# Patient Record
Sex: MALE | URBAN - METROPOLITAN AREA
[De-identification: names, ages, dates, MRNs, and addresses within clinical notes are randomized per-mention and may not be internally consistent; named-entity substitution may affect disease eponyms.]

---

## 2019-09-16 ENCOUNTER — EMERGENCY (EMERGENCY)
Facility: HOSPITAL | Age: 34
LOS: 0 days | Discharge: HOME | End: 2019-09-16
Admitting: EMERGENCY MEDICINE
Payer: OTHER MISCELLANEOUS

## 2019-09-16 VITALS
SYSTOLIC BLOOD PRESSURE: 133 MMHG | DIASTOLIC BLOOD PRESSURE: 79 MMHG | OXYGEN SATURATION: 100 % | TEMPERATURE: 99 F | RESPIRATION RATE: 18 BRPM | WEIGHT: 184.97 LBS | HEART RATE: 72 BPM
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
## 2019-09-16 DIAGNOSIS — M79.643 PAIN IN UNSPECIFIED HAND: ICD-10-CM
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
## 2019-09-16 DIAGNOSIS — S69.92XA UNSPECIFIED INJURY OF LEFT WRIST, HAND AND FINGER(S), INITIAL ENCOUNTER: ICD-10-CM
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
## 2019-09-16 PROCEDURE — 99283 EMERGENCY DEPT VISIT LOW MDM: CPT

## 2019-09-16 PROCEDURE — 73130 X-RAY EXAM OF HAND: CPT | Mod: 26,LT

## 2019-09-16 RX ORDER — IBUPROFEN 200 MG
600 TABLET ORAL ONCE
Refills: 0 | Status: COMPLETED | OUTPATIENT
Start: 2019-09-16 | End: 2019-09-16

## 2019-09-16 RX ADMIN — Medication 600 MILLIGRAM(S): at 09:24

## 2019-09-16 NOTE — ED PROVIDER NOTE - CARE PROVIDER_API CALL
Jordan Owusu)  Orthopaedic Surgery  [address] [place] [address]  Phone: [phone]  Fax: [phone]  Follow Up Time:

## 2019-09-16 NOTE — ED PROVIDER NOTE - PATIENT PORTAL LINK FT
You can access the FollowMyHealth Patient Portal offered by [facility] by registering at the following website: [email or web address]. By joining [facility] FollowMyHealth portal, you will also be able to view your health information using other applications (apps) compatible with our system.

## 2019-09-16 NOTE — ED PROVIDER NOTE - OBJECTIVE STATEMENT
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 I have pain to my thumb and index finger." no numbness/ tingling/ weakness

## 2020-02-20 ENCOUNTER — APPOINTMENT (OUTPATIENT)
Dept: UROLOGY | Facility: CLINIC | Age: 35
End: 2020-02-20

## 2020-03-10 PROBLEM — Z00.00 ENCOUNTER FOR PREVENTIVE HEALTH EXAMINATION: Status: ACTIVE | Noted: 2020-03-10

## 2022-05-05 VITALS
RESPIRATION RATE: 16 BRPM | SYSTOLIC BLOOD PRESSURE: 135 MMHG | DIASTOLIC BLOOD PRESSURE: 89 MMHG | TEMPERATURE: 97 F | HEIGHT: 68 IN | HEART RATE: 59 BPM | WEIGHT: 187.17 LBS | OXYGEN SATURATION: 100 %

## 2022-05-05 RX ORDER — APREPITANT 80 MG/1
40 CAPSULE ORAL ONCE
Refills: 0 | Status: COMPLETED | OUTPATIENT
Start: 2022-05-06 | End: 2022-05-06

## 2022-05-05 RX ORDER — GABAPENTIN 400 MG/1
300 CAPSULE ORAL ONCE
Refills: 0 | Status: COMPLETED | OUTPATIENT
Start: 2022-05-06 | End: 2022-05-06

## 2022-05-05 RX ORDER — POVIDONE-IODINE 5 %
1 AEROSOL (ML) TOPICAL ONCE
Refills: 0 | Status: COMPLETED | OUTPATIENT
Start: 2022-05-06 | End: 2022-05-06

## 2022-05-05 RX ORDER — CHLORHEXIDINE GLUCONATE 213 G/1000ML
1 SOLUTION TOPICAL ONCE
Refills: 0 | Status: COMPLETED | OUTPATIENT
Start: 2022-05-06 | End: 2022-05-06

## 2022-05-05 RX ORDER — ACETAMINOPHEN 500 MG
1000 TABLET ORAL ONCE
Refills: 0 | Status: COMPLETED | OUTPATIENT
Start: 2022-05-06 | End: 2022-05-06

## 2022-05-05 NOTE — H&P ADULT - HISTORY OF PRESENT ILLNESS
Patient is 36 y/o male with neck pain x  Patient elects to undergo ACDF C5-7, L5-S1 PSF with Dr. 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 Denies radiation of pain or numbness/tingling of his extremities. Ambulates without assistive walking devices. Failed conservative therapies for his symptoms.   Patient elects to undergo ACDF C5-7, L5-S1 PSF with Dr. Sinha

## 2022-05-05 NOTE — PRE-OP CHECKLIST - BP NONINVASIVE SYSTOLIC (MM HG)
Gastroenterology Gastroenterology Gastroenterology Nephrology Nephrology Nephrology Gastroenterology Gastroenterology Internal Medicine Nephrology Nephrology Nephrology Nephrology Pulmonology Cardiology Cardiology Cardiology Gastroenterology Gastroenterology Nephrology Pulmonology Pulmonology Pulmonology Hospitalist Nephrology Nephrology Nephrology Gastroenterology Nephrology Internal Medicine Internal Medicine Internal Medicine Internal Medicine Internal Medicine Internal Medicine Internal Medicine Internal Medicine Internal Medicine Internal Medicine Internal Medicine 135

## 2022-05-05 NOTE — H&P ADULT - NSHPLABSRESULTS_GEN_ALL_CORE
preop labs/ekg/cxr reviewed by outpatient medical clearing MD Dr. Puckett  Preop Cr 0.94  Povidone iodine nasal swab to be given day of surgery

## 2022-05-05 NOTE — H&P ADULT - PROBLEM SELECTOR PLAN 1
Admit to Orthopedic Service   For elective ACDF, PSF L5-S1   Medically cleared and optimized for surgery by Admit to Orthopedic Service   For elective ACDF, PSF L5-S1   Medically cleared and optimized for surgery by Dr. Puckett

## 2022-05-05 NOTE — PATIENT PROFILE ADULT - FUNCTIONAL ASSESSMENT - BASIC MOBILITY 2.
Duplicate encounter, patient to be scheduled for follow up visit prior to refills.   4 = No assist / stand by assistance

## 2022-05-05 NOTE — PRE-OP CHECKLIST - IV STARTED
Instructions provided and questions answered. Prescriptions verified with patient.       Christy Ortega RN  09/19/18 0777 no

## 2022-05-05 NOTE — H&P ADULT - NSHPPHYSICALEXAM_GEN_ALL_CORE
Gen:  36 y/o male, NAD  MSK: decreased ROM at the cervical spine 2/2 pain, lumbar spine 2/2 pain Gen:  36 y/o male, NAD  MSK: Skin warm and well perfused. Radial pulses palpable bilateral upper extremities. Sensation intact and equal to bilateral upper and lower extremities. Biceps/triceps 5/5 bilateral upper extremities. TA/EHL/GS/FHL 5/5 bilateral lower extremities. decreased ROM at the cervical spine 2/2 pain, lumbar spine 2/2 pain

## 2022-05-05 NOTE — H&P ADULT - NSHPSOCIALHISTORY_GEN_ALL_CORE
denies cigarette smoking  occasionally uses marijuana for pain control  per chart review drinks etoh monthly or less

## 2022-05-06 ENCOUNTER — INPATIENT (INPATIENT)
Facility: HOSPITAL | Age: 37
LOS: 3 days | Discharge: HOME | DRG: 460 | End: 2022-05-10
Attending: ORTHOPAEDIC SURGERY | Admitting: ORTHOPAEDIC SURGERY
Payer: OTHER MISCELLANEOUS

## 2022-05-06 ENCOUNTER — TRANSCRIPTION ENCOUNTER (OUTPATIENT)
Age: 37
End: 2022-05-06

## 2022-05-06 DIAGNOSIS — M43.17 SPONDYLOLISTHESIS, LUMBOSACRAL REGION: ICD-10-CM

## 2022-05-06 DIAGNOSIS — R39.11 HESITANCY OF MICTURITION: ICD-10-CM

## 2022-05-06 DIAGNOSIS — R21 RASH AND OTHER NONSPECIFIC SKIN ERUPTION: ICD-10-CM

## 2022-05-06 DIAGNOSIS — R13.10 DYSPHAGIA, UNSPECIFIED: ICD-10-CM

## 2022-05-06 DIAGNOSIS — L03.312 CELLULITIS OF BACK [ANY PART EXCEPT BUTTOCK]: ICD-10-CM

## 2022-05-06 DIAGNOSIS — L53.8 OTHER SPECIFIED ERYTHEMATOUS CONDITIONS: ICD-10-CM

## 2022-05-06 DIAGNOSIS — M54.12 RADICULOPATHY, CERVICAL REGION: ICD-10-CM

## 2022-05-06 DIAGNOSIS — M25.78 OSTEOPHYTE, VERTEBRAE: ICD-10-CM

## 2022-05-06 DIAGNOSIS — M50.122 CERVICAL DISC DISORDER AT C5-C6 LEVEL WITH RADICULOPATHY: ICD-10-CM

## 2022-05-06 DIAGNOSIS — T40.2X5A ADVERSE EFFECT OF OTHER OPIOIDS, INITIAL ENCOUNTER: ICD-10-CM

## 2022-05-06 DIAGNOSIS — M51.27 OTHER INTERVERTEBRAL DISC DISPLACEMENT, LUMBOSACRAL REGION: ICD-10-CM

## 2022-05-06 DIAGNOSIS — M48.02 SPINAL STENOSIS, CERVICAL REGION: ICD-10-CM

## 2022-05-06 DIAGNOSIS — M48.07 SPINAL STENOSIS, LUMBOSACRAL REGION: ICD-10-CM

## 2022-05-06 DIAGNOSIS — E87.1 HYPO-OSMOLALITY AND HYPONATREMIA: ICD-10-CM

## 2022-05-06 LAB
BLD GP AB SCN SERPL QL: NEGATIVE — SIGNIFICANT CHANGE UP
GAS PNL BLDA: SIGNIFICANT CHANGE UP
RH IG SCN BLD-IMP: POSITIVE — SIGNIFICANT CHANGE UP

## 2022-05-06 DEVICE — SCREW CERV STAND ALONE VAR ANG 3.5X14MM: Type: IMPLANTABLE DEVICE | Status: FUNCTIONAL

## 2022-05-06 DEVICE — GRAFT BONE INFUSE KIT MED: Type: IMPLANTABLE DEVICE | Status: FUNCTIONAL

## 2022-05-06 DEVICE — ROD 35MM: Type: IMPLANTABLE DEVICE | Status: FUNCTIONAL

## 2022-05-06 DEVICE — CAGE CERV IFD 7 DEG 15X12X8MM STAND ALONE: Type: IMPLANTABLE DEVICE | Status: FUNCTIONAL

## 2022-05-06 DEVICE — SCREW MULTI AXIAL 7.5X40MM: Type: IMPLANTABLE DEVICE | Status: FUNCTIONAL

## 2022-05-06 DEVICE — IMPLANTABLE DEVICE: Type: IMPLANTABLE DEVICE | Status: FUNCTIONAL

## 2022-05-06 DEVICE — SCREW SOLERA 7.5X45X5.5: Type: IMPLANTABLE DEVICE | Status: FUNCTIONAL

## 2022-05-06 DEVICE — SCREW SET: Type: IMPLANTABLE DEVICE | Status: FUNCTIONAL

## 2022-05-06 RX ORDER — HYDROMORPHONE HYDROCHLORIDE 2 MG/ML
0.5 INJECTION INTRAMUSCULAR; INTRAVENOUS; SUBCUTANEOUS EVERY 4 HOURS
Refills: 0 | Status: DISCONTINUED | OUTPATIENT
Start: 2022-05-06 | End: 2022-05-10

## 2022-05-06 RX ORDER — OXYCODONE HYDROCHLORIDE 5 MG/1
10 TABLET ORAL EVERY 4 HOURS
Refills: 0 | Status: DISCONTINUED | OUTPATIENT
Start: 2022-05-06 | End: 2022-05-09

## 2022-05-06 RX ORDER — ONDANSETRON 8 MG/1
4 TABLET, FILM COATED ORAL EVERY 6 HOURS
Refills: 0 | Status: DISCONTINUED | OUTPATIENT
Start: 2022-05-06 | End: 2022-05-10

## 2022-05-06 RX ORDER — HYDROMORPHONE HYDROCHLORIDE 2 MG/ML
0.5 INJECTION INTRAMUSCULAR; INTRAVENOUS; SUBCUTANEOUS
Refills: 0 | Status: DISCONTINUED | OUTPATIENT
Start: 2022-05-06 | End: 2022-05-07

## 2022-05-06 RX ORDER — METHOCARBAMOL 500 MG/1
500 TABLET, FILM COATED ORAL EVERY 8 HOURS
Refills: 0 | Status: DISCONTINUED | OUTPATIENT
Start: 2022-05-06 | End: 2022-05-10

## 2022-05-06 RX ORDER — ACETAMINOPHEN 500 MG
650 TABLET ORAL EVERY 6 HOURS
Refills: 0 | Status: DISCONTINUED | OUTPATIENT
Start: 2022-05-06 | End: 2022-05-06

## 2022-05-06 RX ORDER — SODIUM CHLORIDE 9 MG/ML
1000 INJECTION, SOLUTION INTRAVENOUS
Refills: 0 | Status: DISCONTINUED | OUTPATIENT
Start: 2022-05-06 | End: 2022-05-09

## 2022-05-06 RX ORDER — VANCOMYCIN HCL 1 G
1000 VIAL (EA) INTRAVENOUS ONCE
Refills: 0 | Status: COMPLETED | OUTPATIENT
Start: 2022-05-06 | End: 2022-05-06

## 2022-05-06 RX ORDER — POLYETHYLENE GLYCOL 3350 17 G/17G
17 POWDER, FOR SOLUTION ORAL AT BEDTIME
Refills: 0 | Status: DISCONTINUED | OUTPATIENT
Start: 2022-05-06 | End: 2022-05-10

## 2022-05-06 RX ORDER — MAGNESIUM HYDROXIDE 400 MG/1
30 TABLET, CHEWABLE ORAL DAILY
Refills: 0 | Status: DISCONTINUED | OUTPATIENT
Start: 2022-05-06 | End: 2022-05-10

## 2022-05-06 RX ORDER — ACETAMINOPHEN 500 MG
975 TABLET ORAL EVERY 8 HOURS
Refills: 0 | Status: DISCONTINUED | OUTPATIENT
Start: 2022-05-06 | End: 2022-05-10

## 2022-05-06 RX ORDER — SENNA PLUS 8.6 MG/1
2 TABLET ORAL AT BEDTIME
Refills: 0 | Status: DISCONTINUED | OUTPATIENT
Start: 2022-05-06 | End: 2022-05-10

## 2022-05-06 RX ORDER — OXYCODONE HYDROCHLORIDE 5 MG/1
5 TABLET ORAL EVERY 4 HOURS
Refills: 0 | Status: DISCONTINUED | OUTPATIENT
Start: 2022-05-06 | End: 2022-05-09

## 2022-05-06 RX ORDER — PANTOPRAZOLE SODIUM 20 MG/1
40 TABLET, DELAYED RELEASE ORAL
Refills: 0 | Status: DISCONTINUED | OUTPATIENT
Start: 2022-05-06 | End: 2022-05-10

## 2022-05-06 RX ADMIN — HYDROMORPHONE HYDROCHLORIDE 0.5 MILLIGRAM(S): 2 INJECTION INTRAMUSCULAR; INTRAVENOUS; SUBCUTANEOUS at 18:05

## 2022-05-06 RX ADMIN — HYDROMORPHONE HYDROCHLORIDE 0.5 MILLIGRAM(S): 2 INJECTION INTRAMUSCULAR; INTRAVENOUS; SUBCUTANEOUS at 19:46

## 2022-05-06 RX ADMIN — CHLORHEXIDINE GLUCONATE 1 APPLICATION(S): 213 SOLUTION TOPICAL at 08:20

## 2022-05-06 RX ADMIN — GABAPENTIN 300 MILLIGRAM(S): 400 CAPSULE ORAL at 08:31

## 2022-05-06 RX ADMIN — HYDROMORPHONE HYDROCHLORIDE 0.5 MILLIGRAM(S): 2 INJECTION INTRAMUSCULAR; INTRAVENOUS; SUBCUTANEOUS at 21:45

## 2022-05-06 RX ADMIN — Medication 975 MILLIGRAM(S): at 22:46

## 2022-05-06 RX ADMIN — Medication 975 MILLIGRAM(S): at 21:46

## 2022-05-06 RX ADMIN — Medication 50 MILLIGRAM(S): at 21:46

## 2022-05-06 RX ADMIN — METHOCARBAMOL 500 MILLIGRAM(S): 500 TABLET, FILM COATED ORAL at 21:46

## 2022-05-06 RX ADMIN — Medication 1000 MILLIGRAM(S): at 08:31

## 2022-05-06 RX ADMIN — Medication 250 MILLIGRAM(S): at 22:03

## 2022-05-06 RX ADMIN — OXYCODONE HYDROCHLORIDE 10 MILLIGRAM(S): 5 TABLET ORAL at 19:01

## 2022-05-06 RX ADMIN — HYDROMORPHONE HYDROCHLORIDE 0.5 MILLIGRAM(S): 2 INJECTION INTRAMUSCULAR; INTRAVENOUS; SUBCUTANEOUS at 22:00

## 2022-05-06 RX ADMIN — APREPITANT 40 MILLIGRAM(S): 80 CAPSULE ORAL at 08:31

## 2022-05-06 RX ADMIN — OXYCODONE HYDROCHLORIDE 10 MILLIGRAM(S): 5 TABLET ORAL at 23:49

## 2022-05-06 RX ADMIN — HYDROMORPHONE HYDROCHLORIDE 0.5 MILLIGRAM(S): 2 INJECTION INTRAMUSCULAR; INTRAVENOUS; SUBCUTANEOUS at 18:34

## 2022-05-06 RX ADMIN — Medication 1 APPLICATION(S): at 08:32

## 2022-05-06 NOTE — BRIEF OPERATIVE NOTE - NSICDXBRIEFPROCEDURE_GEN_ALL_CORE_FT
PROCEDURES:  Anterior cervical discectomy with fusion 06-May-2022 10:09:44  Aristeo Craft  Posterior fusion of lumbar spine with laminectomy 06-May-2022 10:10:07  Aristeo Craft

## 2022-05-06 NOTE — PACU DISCHARGE NOTE - COMMENTS
Met PACU criteria for transfer to [address] [place] [address], transferred via bed monitored, endorsed to MARITO Mendenhall.

## 2022-05-06 NOTE — BRIEF OPERATIVE NOTE - NSICDXBRIEFPOSTOP_GEN_ALL_CORE_FT
POST-OP DIAGNOSIS:  HNP (herniated nucleus pulposus), cervical 06-May-2022 10:10:37  Aristeo Craft  HNP (herniated nucleus pulposus), lumbar 06-May-2022 10:10:45  Aristeo Craft   POST-OP DIAGNOSIS:  HNP (herniated nucleus pulposus), cervical 06-May-2022 10:10:37  Aristeo Craft  Spondylolisthesis at L5-S1 level 06-May-2022 15:21:56  Aristeo Craft

## 2022-05-06 NOTE — BRIEF OPERATIVE NOTE - NSICDXBRIEFPREOP_GEN_ALL_CORE_FT
PRE-OP DIAGNOSIS:  HNP (herniated nucleus pulposus), lumbar 06-May-2022 10:10:15  Aristeo Craft  HNP (herniated nucleus pulposus), cervical 06-May-2022 10:10:24  Aristeo Craft   PRE-OP DIAGNOSIS:  HNP (herniated nucleus pulposus), cervical 06-May-2022 10:10:24  Aristeo Craft  Spondylolisthesis at L5-S1 level 06-May-2022 15:22:10  Aristeo Craft

## 2022-05-06 NOTE — PROGRESS NOTE ADULT - SUBJECTIVE AND OBJECTIVE BOX
Ortho Post Op Check    Procedure: ACDF C4-6, PSF L5-S1  Surgeon: Dr. COHEN Main    Subjective:  Pain controlled with medication.  Denies CP, SOB, N/V, numbness/tingling.    Objective:  Vital Signs Last 24 Hrs  T(C): 36.1 (05-06-22 @ 15:51), Max: 36.1 (05-06-22 @ 15:51)  T(F): 96.9 (05-06-22 @ 15:51), Max: 96.9 (05-06-22 @ 15:51)  HR: 72 (05-06-22 @ 19:55) (68 - 81)  BP: 132/77 (05-06-22 @ 19:06) (104/56 - 132/77)  BP(mean): 95 (05-06-22 @ 19:06) (73 - 96)  RR: 9 (05-06-22 @ 19:55) (9 - 21)  SpO2: 99% (05-06-22 @ 19:55) (99% - 100%)  AVSS    General: Pt Alert and oriented, NAD  Dressing C/D/I  B/l UE:  Sensation: SILT C5-T1   Motor:   C5 (deltoid abduction) 5/5   C6 (biceps flexion)  5/5   C7 (triceps extension) 5/5   C8 (finger flexion)  5/5   T1 (interosseous)  5/5  B/l LE:  Sensation: SILT L2-S1  Motor:   L2 (hip flexion)  5/5   L3 (knee extension)  5/5   L4 (ankle dorsiflexion)  5/5   L5 (long toe extension) 5/5   S1 (ankle plantar flexion) 5/5    A/P: 37yMale s/p ACDF C4-6, PSF L5-S1 on 05-06.  - Stable  - Pain Control  - DVT ppx: SCDs  - Post op abx: Vanco  - PT, WBS: WBAT    Ortho Pager [phone]

## 2022-05-07 LAB
ANION GAP SERPL CALC-SCNC: 12 MMOL/L — SIGNIFICANT CHANGE UP (ref 5–17)
ANISOCYTOSIS BLD QL: SLIGHT — SIGNIFICANT CHANGE UP
BASOPHILS # BLD AUTO: 0 K/UL — SIGNIFICANT CHANGE UP (ref 0–0.2)
BASOPHILS NFR BLD AUTO: 0 % — SIGNIFICANT CHANGE UP (ref 0–2)
BUN SERPL-MCNC: 11 MG/DL — SIGNIFICANT CHANGE UP (ref 7–23)
CALCIUM SERPL-MCNC: 8.8 MG/DL — SIGNIFICANT CHANGE UP (ref 8.4–10.5)
CHLORIDE SERPL-SCNC: 102 MMOL/L — SIGNIFICANT CHANGE UP (ref 96–108)
CO2 SERPL-SCNC: 25 MMOL/L — SIGNIFICANT CHANGE UP (ref 22–31)
CREAT SERPL-MCNC: 0.85 MG/DL — SIGNIFICANT CHANGE UP (ref 0.5–1.3)
EGFR: 114 ML/MIN/1.73M2 — SIGNIFICANT CHANGE UP
EOSINOPHIL # BLD AUTO: 0 K/UL — SIGNIFICANT CHANGE UP (ref 0–0.5)
EOSINOPHIL NFR BLD AUTO: 0 % — SIGNIFICANT CHANGE UP (ref 0–6)
GLUCOSE SERPL-MCNC: 121 MG/DL — HIGH (ref 70–99)
HCT VFR BLD CALC: 39.4 % — SIGNIFICANT CHANGE UP (ref 39–50)
HGB BLD-MCNC: 12.7 G/DL — LOW (ref 13–17)
HYPOCHROMIA BLD QL: SLIGHT — SIGNIFICANT CHANGE UP
LYMPHOCYTES # BLD AUTO: 1.47 K/UL — SIGNIFICANT CHANGE UP (ref 1–3.3)
LYMPHOCYTES # BLD AUTO: 10.5 % — LOW (ref 13–44)
MACROCYTES BLD QL: SLIGHT — SIGNIFICANT CHANGE UP
MANUAL SMEAR VERIFICATION: SIGNIFICANT CHANGE UP
MCHC RBC-ENTMCNC: 26.6 PG — LOW (ref 27–34)
MCHC RBC-ENTMCNC: 32.2 GM/DL — SIGNIFICANT CHANGE UP (ref 32–36)
MCV RBC AUTO: 82.4 FL — SIGNIFICANT CHANGE UP (ref 80–100)
MICROCYTES BLD QL: SLIGHT — SIGNIFICANT CHANGE UP
MONOCYTES # BLD AUTO: 1.46 K/UL — HIGH (ref 0–0.9)
MONOCYTES NFR BLD AUTO: 10.4 % — SIGNIFICANT CHANGE UP (ref 2–14)
NEUTROPHILS # BLD AUTO: 11.1 K/UL — HIGH (ref 1.8–7.4)
NEUTROPHILS NFR BLD AUTO: 79.1 % — HIGH (ref 43–77)
OVALOCYTES BLD QL SMEAR: SLIGHT — SIGNIFICANT CHANGE UP
PLAT MORPH BLD: NORMAL — SIGNIFICANT CHANGE UP
PLATELET # BLD AUTO: 265 K/UL — SIGNIFICANT CHANGE UP (ref 150–400)
POIKILOCYTOSIS BLD QL AUTO: SLIGHT — SIGNIFICANT CHANGE UP
POLYCHROMASIA BLD QL SMEAR: SLIGHT — SIGNIFICANT CHANGE UP
POTASSIUM SERPL-MCNC: 3.7 MMOL/L — SIGNIFICANT CHANGE UP (ref 3.5–5.3)
POTASSIUM SERPL-SCNC: 3.7 MMOL/L — SIGNIFICANT CHANGE UP (ref 3.5–5.3)
RBC # BLD: 4.78 M/UL — SIGNIFICANT CHANGE UP (ref 4.2–5.8)
RBC # FLD: 13.1 % — SIGNIFICANT CHANGE UP (ref 10.3–14.5)
RBC BLD AUTO: ABNORMAL
SODIUM SERPL-SCNC: 139 MMOL/L — SIGNIFICANT CHANGE UP (ref 135–145)
SPHEROCYTES BLD QL SMEAR: SLIGHT — SIGNIFICANT CHANGE UP
WBC # BLD: 14.03 K/UL — HIGH (ref 3.8–10.5)
WBC # FLD AUTO: 14.03 K/UL — HIGH (ref 3.8–10.5)

## 2022-05-07 RX ADMIN — OXYCODONE HYDROCHLORIDE 10 MILLIGRAM(S): 5 TABLET ORAL at 05:47

## 2022-05-07 RX ADMIN — Medication 975 MILLIGRAM(S): at 13:45

## 2022-05-07 RX ADMIN — Medication 975 MILLIGRAM(S): at 21:24

## 2022-05-07 RX ADMIN — OXYCODONE HYDROCHLORIDE 10 MILLIGRAM(S): 5 TABLET ORAL at 16:10

## 2022-05-07 RX ADMIN — Medication 50 MILLIGRAM(S): at 13:16

## 2022-05-07 RX ADMIN — Medication 50 MILLIGRAM(S): at 21:24

## 2022-05-07 RX ADMIN — HYDROMORPHONE HYDROCHLORIDE 0.5 MILLIGRAM(S): 2 INJECTION INTRAMUSCULAR; INTRAVENOUS; SUBCUTANEOUS at 21:40

## 2022-05-07 RX ADMIN — OXYCODONE HYDROCHLORIDE 10 MILLIGRAM(S): 5 TABLET ORAL at 11:38

## 2022-05-07 RX ADMIN — HYDROMORPHONE HYDROCHLORIDE 0.5 MILLIGRAM(S): 2 INJECTION INTRAMUSCULAR; INTRAVENOUS; SUBCUTANEOUS at 18:00

## 2022-05-07 RX ADMIN — OXYCODONE HYDROCHLORIDE 10 MILLIGRAM(S): 5 TABLET ORAL at 20:35

## 2022-05-07 RX ADMIN — Medication 975 MILLIGRAM(S): at 13:16

## 2022-05-07 RX ADMIN — POLYETHYLENE GLYCOL 3350 17 GRAM(S): 17 POWDER, FOR SOLUTION ORAL at 21:25

## 2022-05-07 RX ADMIN — HYDROMORPHONE HYDROCHLORIDE 0.5 MILLIGRAM(S): 2 INJECTION INTRAMUSCULAR; INTRAVENOUS; SUBCUTANEOUS at 02:50

## 2022-05-07 RX ADMIN — METHOCARBAMOL 500 MILLIGRAM(S): 500 TABLET, FILM COATED ORAL at 21:24

## 2022-05-07 RX ADMIN — METHOCARBAMOL 500 MILLIGRAM(S): 500 TABLET, FILM COATED ORAL at 13:16

## 2022-05-07 RX ADMIN — POLYETHYLENE GLYCOL 3350 17 GRAM(S): 17 POWDER, FOR SOLUTION ORAL at 05:42

## 2022-05-07 RX ADMIN — METHOCARBAMOL 500 MILLIGRAM(S): 500 TABLET, FILM COATED ORAL at 05:41

## 2022-05-07 RX ADMIN — MAGNESIUM HYDROXIDE 30 MILLILITER(S): 400 TABLET, CHEWABLE ORAL at 12:13

## 2022-05-07 RX ADMIN — OXYCODONE HYDROCHLORIDE 10 MILLIGRAM(S): 5 TABLET ORAL at 00:49

## 2022-05-07 RX ADMIN — Medication 975 MILLIGRAM(S): at 22:24

## 2022-05-07 RX ADMIN — HYDROMORPHONE HYDROCHLORIDE 0.5 MILLIGRAM(S): 2 INJECTION INTRAMUSCULAR; INTRAVENOUS; SUBCUTANEOUS at 03:15

## 2022-05-07 RX ADMIN — HYDROMORPHONE HYDROCHLORIDE 0.5 MILLIGRAM(S): 2 INJECTION INTRAMUSCULAR; INTRAVENOUS; SUBCUTANEOUS at 08:04

## 2022-05-07 RX ADMIN — Medication 975 MILLIGRAM(S): at 06:41

## 2022-05-07 RX ADMIN — HYDROMORPHONE HYDROCHLORIDE 0.5 MILLIGRAM(S): 2 INJECTION INTRAMUSCULAR; INTRAVENOUS; SUBCUTANEOUS at 13:30

## 2022-05-07 RX ADMIN — HYDROMORPHONE HYDROCHLORIDE 0.5 MILLIGRAM(S): 2 INJECTION INTRAMUSCULAR; INTRAVENOUS; SUBCUTANEOUS at 22:10

## 2022-05-07 RX ADMIN — Medication 975 MILLIGRAM(S): at 05:41

## 2022-05-07 RX ADMIN — SENNA PLUS 2 TABLET(S): 8.6 TABLET ORAL at 21:24

## 2022-05-07 RX ADMIN — Medication 50 MILLIGRAM(S): at 05:41

## 2022-05-07 RX ADMIN — OXYCODONE HYDROCHLORIDE 10 MILLIGRAM(S): 5 TABLET ORAL at 15:40

## 2022-05-07 RX ADMIN — OXYCODONE HYDROCHLORIDE 10 MILLIGRAM(S): 5 TABLET ORAL at 04:47

## 2022-05-07 RX ADMIN — HYDROMORPHONE HYDROCHLORIDE 0.5 MILLIGRAM(S): 2 INJECTION INTRAMUSCULAR; INTRAVENOUS; SUBCUTANEOUS at 13:14

## 2022-05-07 RX ADMIN — OXYCODONE HYDROCHLORIDE 10 MILLIGRAM(S): 5 TABLET ORAL at 12:10

## 2022-05-07 RX ADMIN — PANTOPRAZOLE SODIUM 40 MILLIGRAM(S): 20 TABLET, DELAYED RELEASE ORAL at 05:41

## 2022-05-07 RX ADMIN — HYDROMORPHONE HYDROCHLORIDE 0.5 MILLIGRAM(S): 2 INJECTION INTRAMUSCULAR; INTRAVENOUS; SUBCUTANEOUS at 17:37

## 2022-05-07 RX ADMIN — OXYCODONE HYDROCHLORIDE 10 MILLIGRAM(S): 5 TABLET ORAL at 19:35

## 2022-05-07 NOTE — PHYSICAL THERAPY INITIAL EVALUATION ADULT - ADDITIONAL COMMENTS
Pt lives in a multi-level house with wife & children, 5 MIRTA + 5 + 5 steps to get into bedroom. Owns recliner bed. No DME

## 2022-05-07 NOTE — PROGRESS NOTE ADULT - SUBJECTIVE AND OBJECTIVE BOX
Ortho Floor Note    Subjective:  Pain controlled with medication. Penrose drain removed this AM.  Denies CP, SOB, N/V, numbness/tingling.    Objective:  Vital Signs Last 24 Hrs  T(C): 36.1 (05-06-22 @ 15:51), Max: 36.1 (05-06-22 @ 15:51)  T(F): 96.9 (05-06-22 @ 15:51), Max: 96.9 (05-06-22 @ 15:51)  HR: 72 (05-06-22 @ 19:55) (68 - 81)  BP: 132/77 (05-06-22 @ 19:06) (104/56 - 132/77)  BP(mean): 95 (05-06-22 @ 19:06) (73 - 96)  RR: 9 (05-06-22 @ 19:55) (9 - 21)  SpO2: 99% (05-06-22 @ 19:55) (99% - 100%)  AVSS    General: Pt Alert and oriented, NAD  Dressing C/D/I  B/l UE:  Sensation: SILT C5-T1   Motor:   C5 (deltoid abduction) 5/5   C6 (biceps flexion)  5/5   C7 (triceps extension) 5/5   C8 (finger flexion)  5/5   T1 (interosseous)  5/5  B/l LE:  Sensation: SILT L2-S1  Motor:   L2 (hip flexion)  5/5   L3 (knee extension)  5/5   L4 (ankle dorsiflexion)  5/5   L5 (long toe extension) 5/5   S1 (ankle plantar flexion) 5/5    A/P: 37yMale s/p ACDF C4-6, PSF L5-S1 on 05-06.  - Stable  - Pain Control  - DVT ppx: SCDs  - PT, WBS: WBAT  - Dispo: Home    Ortho Pager [phone]

## 2022-05-07 NOTE — OCCUPATIONAL THERAPY INITIAL EVALUATION ADULT - PERTINENT HX OF CURRENT PROBLEM, REHAB EVAL
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 Denies radiation of pain or numbness/tingling of his extremities. Ambulates without assistive walking devices. Failed conservative therapies for his symptoms.

## 2022-05-07 NOTE — PHYSICAL THERAPY INITIAL EVALUATION ADULT - GENERAL OBSERVATIONS, REHAB EVAL
Patient received semi-winchester in bed  in NAD on RA, +SCDs, +Heplock, +tele. Dressings C/D/I Cleared by RNVonnie. Agreeable to PT.

## 2022-05-08 LAB
ANION GAP SERPL CALC-SCNC: 9 MMOL/L — SIGNIFICANT CHANGE UP (ref 5–17)
BASOPHILS # BLD AUTO: 0.04 K/UL — SIGNIFICANT CHANGE UP (ref 0–0.2)
BASOPHILS NFR BLD AUTO: 0.3 % — SIGNIFICANT CHANGE UP (ref 0–2)
BUN SERPL-MCNC: 10 MG/DL — SIGNIFICANT CHANGE UP (ref 7–23)
CALCIUM SERPL-MCNC: 8.4 MG/DL — SIGNIFICANT CHANGE UP (ref 8.4–10.5)
CHLORIDE SERPL-SCNC: 98 MMOL/L — SIGNIFICANT CHANGE UP (ref 96–108)
CO2 SERPL-SCNC: 26 MMOL/L — SIGNIFICANT CHANGE UP (ref 22–31)
CREAT SERPL-MCNC: 0.95 MG/DL — SIGNIFICANT CHANGE UP (ref 0.5–1.3)
EGFR: 105 ML/MIN/1.73M2 — SIGNIFICANT CHANGE UP
EOSINOPHIL # BLD AUTO: 0.05 K/UL — SIGNIFICANT CHANGE UP (ref 0–0.5)
EOSINOPHIL NFR BLD AUTO: 0.4 % — SIGNIFICANT CHANGE UP (ref 0–6)
GLUCOSE SERPL-MCNC: 106 MG/DL — HIGH (ref 70–99)
HCT VFR BLD CALC: 39.3 % — SIGNIFICANT CHANGE UP (ref 39–50)
HGB BLD-MCNC: 12.5 G/DL — LOW (ref 13–17)
IMM GRANULOCYTES NFR BLD AUTO: 0.7 % — SIGNIFICANT CHANGE UP (ref 0–1.5)
LYMPHOCYTES # BLD AUTO: 1.76 K/UL — SIGNIFICANT CHANGE UP (ref 1–3.3)
LYMPHOCYTES # BLD AUTO: 15.1 % — SIGNIFICANT CHANGE UP (ref 13–44)
MCHC RBC-ENTMCNC: 26.5 PG — LOW (ref 27–34)
MCHC RBC-ENTMCNC: 31.8 GM/DL — LOW (ref 32–36)
MCV RBC AUTO: 83.3 FL — SIGNIFICANT CHANGE UP (ref 80–100)
MONOCYTES # BLD AUTO: 1.63 K/UL — HIGH (ref 0–0.9)
MONOCYTES NFR BLD AUTO: 13.9 % — SIGNIFICANT CHANGE UP (ref 2–14)
NEUTROPHILS # BLD AUTO: 8.13 K/UL — HIGH (ref 1.8–7.4)
NEUTROPHILS NFR BLD AUTO: 69.6 % — SIGNIFICANT CHANGE UP (ref 43–77)
NRBC # BLD: 0 /100 WBCS — SIGNIFICANT CHANGE UP (ref 0–0)
PLATELET # BLD AUTO: 227 K/UL — SIGNIFICANT CHANGE UP (ref 150–400)
POTASSIUM SERPL-MCNC: 3.7 MMOL/L — SIGNIFICANT CHANGE UP (ref 3.5–5.3)
POTASSIUM SERPL-SCNC: 3.7 MMOL/L — SIGNIFICANT CHANGE UP (ref 3.5–5.3)
RBC # BLD: 4.72 M/UL — SIGNIFICANT CHANGE UP (ref 4.2–5.8)
RBC # FLD: 13.2 % — SIGNIFICANT CHANGE UP (ref 10.3–14.5)
SODIUM SERPL-SCNC: 133 MMOL/L — LOW (ref 135–145)
WBC # BLD: 11.69 K/UL — HIGH (ref 3.8–10.5)
WBC # FLD AUTO: 11.69 K/UL — HIGH (ref 3.8–10.5)

## 2022-05-08 RX ORDER — DIPHENHYDRAMINE HCL 50 MG
50 CAPSULE ORAL EVERY 6 HOURS
Refills: 0 | Status: DISCONTINUED | OUTPATIENT
Start: 2022-05-08 | End: 2022-05-10

## 2022-05-08 RX ORDER — HYDROCORTISONE 1 %
1 OINTMENT (GRAM) TOPICAL DAILY
Refills: 0 | Status: DISCONTINUED | OUTPATIENT
Start: 2022-05-08 | End: 2022-05-09

## 2022-05-08 RX ADMIN — PANTOPRAZOLE SODIUM 40 MILLIGRAM(S): 20 TABLET, DELAYED RELEASE ORAL at 05:43

## 2022-05-08 RX ADMIN — Medication 50 MILLIGRAM(S): at 23:15

## 2022-05-08 RX ADMIN — METHOCARBAMOL 500 MILLIGRAM(S): 500 TABLET, FILM COATED ORAL at 14:11

## 2022-05-08 RX ADMIN — Medication 975 MILLIGRAM(S): at 22:34

## 2022-05-08 RX ADMIN — HYDROMORPHONE HYDROCHLORIDE 0.5 MILLIGRAM(S): 2 INJECTION INTRAMUSCULAR; INTRAVENOUS; SUBCUTANEOUS at 12:05

## 2022-05-08 RX ADMIN — OXYCODONE HYDROCHLORIDE 10 MILLIGRAM(S): 5 TABLET ORAL at 10:31

## 2022-05-08 RX ADMIN — OXYCODONE HYDROCHLORIDE 10 MILLIGRAM(S): 5 TABLET ORAL at 14:17

## 2022-05-08 RX ADMIN — Medication 50 MILLIGRAM(S): at 17:47

## 2022-05-08 RX ADMIN — HYDROMORPHONE HYDROCHLORIDE 0.5 MILLIGRAM(S): 2 INJECTION INTRAMUSCULAR; INTRAVENOUS; SUBCUTANEOUS at 11:29

## 2022-05-08 RX ADMIN — OXYCODONE HYDROCHLORIDE 10 MILLIGRAM(S): 5 TABLET ORAL at 23:15

## 2022-05-08 RX ADMIN — OXYCODONE HYDROCHLORIDE 10 MILLIGRAM(S): 5 TABLET ORAL at 18:47

## 2022-05-08 RX ADMIN — METHOCARBAMOL 500 MILLIGRAM(S): 500 TABLET, FILM COATED ORAL at 05:44

## 2022-05-08 RX ADMIN — HYDROMORPHONE HYDROCHLORIDE 0.5 MILLIGRAM(S): 2 INJECTION INTRAMUSCULAR; INTRAVENOUS; SUBCUTANEOUS at 16:25

## 2022-05-08 RX ADMIN — POLYETHYLENE GLYCOL 3350 17 GRAM(S): 17 POWDER, FOR SOLUTION ORAL at 21:35

## 2022-05-08 RX ADMIN — Medication 975 MILLIGRAM(S): at 06:37

## 2022-05-08 RX ADMIN — HYDROMORPHONE HYDROCHLORIDE 0.5 MILLIGRAM(S): 2 INJECTION INTRAMUSCULAR; INTRAVENOUS; SUBCUTANEOUS at 03:24

## 2022-05-08 RX ADMIN — Medication 975 MILLIGRAM(S): at 15:02

## 2022-05-08 RX ADMIN — OXYCODONE HYDROCHLORIDE 10 MILLIGRAM(S): 5 TABLET ORAL at 01:32

## 2022-05-08 RX ADMIN — OXYCODONE HYDROCHLORIDE 10 MILLIGRAM(S): 5 TABLET ORAL at 09:50

## 2022-05-08 RX ADMIN — HYDROMORPHONE HYDROCHLORIDE 0.5 MILLIGRAM(S): 2 INJECTION INTRAMUSCULAR; INTRAVENOUS; SUBCUTANEOUS at 03:53

## 2022-05-08 RX ADMIN — Medication 975 MILLIGRAM(S): at 05:44

## 2022-05-08 RX ADMIN — OXYCODONE HYDROCHLORIDE 10 MILLIGRAM(S): 5 TABLET ORAL at 19:32

## 2022-05-08 RX ADMIN — HYDROMORPHONE HYDROCHLORIDE 0.5 MILLIGRAM(S): 2 INJECTION INTRAMUSCULAR; INTRAVENOUS; SUBCUTANEOUS at 21:14

## 2022-05-08 RX ADMIN — METHOCARBAMOL 500 MILLIGRAM(S): 500 TABLET, FILM COATED ORAL at 21:35

## 2022-05-08 RX ADMIN — Medication 975 MILLIGRAM(S): at 14:11

## 2022-05-08 RX ADMIN — HYDROMORPHONE HYDROCHLORIDE 0.5 MILLIGRAM(S): 2 INJECTION INTRAMUSCULAR; INTRAVENOUS; SUBCUTANEOUS at 20:44

## 2022-05-08 RX ADMIN — Medication 975 MILLIGRAM(S): at 21:34

## 2022-05-08 RX ADMIN — HYDROMORPHONE HYDROCHLORIDE 0.5 MILLIGRAM(S): 2 INJECTION INTRAMUSCULAR; INTRAVENOUS; SUBCUTANEOUS at 17:16

## 2022-05-08 RX ADMIN — OXYCODONE HYDROCHLORIDE 10 MILLIGRAM(S): 5 TABLET ORAL at 15:16

## 2022-05-08 RX ADMIN — OXYCODONE HYDROCHLORIDE 10 MILLIGRAM(S): 5 TABLET ORAL at 00:33

## 2022-05-08 RX ADMIN — Medication 50 MILLIGRAM(S): at 05:43

## 2022-05-08 RX ADMIN — Medication 50 MILLIGRAM(S): at 21:35

## 2022-05-08 RX ADMIN — SENNA PLUS 2 TABLET(S): 8.6 TABLET ORAL at 21:35

## 2022-05-08 RX ADMIN — Medication 50 MILLIGRAM(S): at 15:09

## 2022-05-08 NOTE — PROGRESS NOTE ADULT - SUBJECTIVE AND OBJECTIVE BOX
Ortho Floor Note    Subjective:  Complaining of some pain, controlled with medication.  Denies CP, SOB, N/V, numbness/tingling.    Objective:  Vital Signs Last 24 Hrs  T(C): 37.1 (08 May 2022 04:45), Max: 37.1 (08 May 2022 04:45)  T(F): 98.7 (08 May 2022 04:45), Max: 98.7 (08 May 2022 04:45)  HR: 79 (08 May 2022 04:45) (71 - 111)  BP: 126/80 (08 May 2022 04:45) (116/79 - 137/81)  BP(mean): --  RR: 17 (08 May 2022 04:45) (16 - 18)  SpO2: 99% (08 May 2022 04:45) (97% - 100%)    General: Pt Alert and oriented, NAD  Dressing C/D/I  B/l UE:  Sensation: SILT C5-T1   Motor:   C5 (deltoid abduction) 5/5   C6 (biceps flexion)  5/5   C7 (triceps extension) 5/5   C8 (finger flexion)  5/5   T1 (interosseous)  5/5  B/l LE:  Sensation: SILT L2-S1  Motor:   L2 (hip flexion)  5/5   L3 (knee extension)  5/5   L4 (ankle dorsiflexion)  5/5   L5 (long toe extension) 5/5   S1 (ankle plantar flexion) 5/5    A/P: 37yMale s/p ACDF C4-6, PSF L5-S1 on 05-06.  - Stable  - Pain Control  - DVT ppx: SCDs  - PT, WBS: WBAT  - Dispo: Home    Ortho Pager [phone]

## 2022-05-09 ENCOUNTER — TRANSCRIPTION ENCOUNTER (OUTPATIENT)
Age: 37
End: 2022-05-09

## 2022-05-09 LAB
ANION GAP SERPL CALC-SCNC: 12 MMOL/L — SIGNIFICANT CHANGE UP (ref 5–17)
BUN SERPL-MCNC: 9 MG/DL — SIGNIFICANT CHANGE UP (ref 7–23)
CALCIUM SERPL-MCNC: 8.7 MG/DL — SIGNIFICANT CHANGE UP (ref 8.4–10.5)
CHLORIDE SERPL-SCNC: 96 MMOL/L — SIGNIFICANT CHANGE UP (ref 96–108)
CO2 SERPL-SCNC: 23 MMOL/L — SIGNIFICANT CHANGE UP (ref 22–31)
CREAT SERPL-MCNC: 0.88 MG/DL — SIGNIFICANT CHANGE UP (ref 0.5–1.3)
EGFR: 113 ML/MIN/1.73M2 — SIGNIFICANT CHANGE UP
GLUCOSE SERPL-MCNC: 115 MG/DL — HIGH (ref 70–99)
HCT VFR BLD CALC: 41.9 % — SIGNIFICANT CHANGE UP (ref 39–50)
HGB BLD-MCNC: 13.8 G/DL — SIGNIFICANT CHANGE UP (ref 13–17)
MCHC RBC-ENTMCNC: 27.3 PG — SIGNIFICANT CHANGE UP (ref 27–34)
MCHC RBC-ENTMCNC: 32.9 GM/DL — SIGNIFICANT CHANGE UP (ref 32–36)
MCV RBC AUTO: 82.8 FL — SIGNIFICANT CHANGE UP (ref 80–100)
NRBC # BLD: 0 /100 WBCS — SIGNIFICANT CHANGE UP (ref 0–0)
OSMOLALITY UR: 173 MOSM/KG — LOW (ref 300–900)
PLATELET # BLD AUTO: 257 K/UL — SIGNIFICANT CHANGE UP (ref 150–400)
POTASSIUM SERPL-MCNC: 3.9 MMOL/L — SIGNIFICANT CHANGE UP (ref 3.5–5.3)
POTASSIUM SERPL-SCNC: 3.9 MMOL/L — SIGNIFICANT CHANGE UP (ref 3.5–5.3)
RBC # BLD: 5.06 M/UL — SIGNIFICANT CHANGE UP (ref 4.2–5.8)
RBC # FLD: 12.9 % — SIGNIFICANT CHANGE UP (ref 10.3–14.5)
SODIUM SERPL-SCNC: 131 MMOL/L — LOW (ref 135–145)
SODIUM UR-SCNC: <20 MMOL/L — SIGNIFICANT CHANGE UP
WBC # BLD: 13.46 K/UL — HIGH (ref 3.8–10.5)
WBC # FLD AUTO: 13.46 K/UL — HIGH (ref 3.8–10.5)

## 2022-05-09 PROCEDURE — 99255 IP/OBS CONSLTJ NEW/EST HI 80: CPT

## 2022-05-09 RX ORDER — HYDROMORPHONE HYDROCHLORIDE 2 MG/ML
4 INJECTION INTRAMUSCULAR; INTRAVENOUS; SUBCUTANEOUS EVERY 4 HOURS
Refills: 0 | Status: DISCONTINUED | OUTPATIENT
Start: 2022-05-09 | End: 2022-05-10

## 2022-05-09 RX ORDER — HYDROCORTISONE 1 %
1 OINTMENT (GRAM) TOPICAL
Refills: 0 | Status: DISCONTINUED | OUTPATIENT
Start: 2022-05-09 | End: 2022-05-10

## 2022-05-09 RX ORDER — BENZOCAINE AND MENTHOL 5; 1 G/100ML; G/100ML
1 LIQUID ORAL
Refills: 0 | Status: DISCONTINUED | OUTPATIENT
Start: 2022-05-09 | End: 2022-05-10

## 2022-05-09 RX ORDER — HYDROMORPHONE HYDROCHLORIDE 2 MG/ML
2 INJECTION INTRAMUSCULAR; INTRAVENOUS; SUBCUTANEOUS EVERY 4 HOURS
Refills: 0 | Status: DISCONTINUED | OUTPATIENT
Start: 2022-05-09 | End: 2022-05-10

## 2022-05-09 RX ADMIN — Medication 50 MILLIGRAM(S): at 05:33

## 2022-05-09 RX ADMIN — HYDROMORPHONE HYDROCHLORIDE 4 MILLIGRAM(S): 2 INJECTION INTRAMUSCULAR; INTRAVENOUS; SUBCUTANEOUS at 22:15

## 2022-05-09 RX ADMIN — HYDROMORPHONE HYDROCHLORIDE 0.5 MILLIGRAM(S): 2 INJECTION INTRAMUSCULAR; INTRAVENOUS; SUBCUTANEOUS at 15:10

## 2022-05-09 RX ADMIN — SENNA PLUS 2 TABLET(S): 8.6 TABLET ORAL at 21:17

## 2022-05-09 RX ADMIN — Medication 975 MILLIGRAM(S): at 06:33

## 2022-05-09 RX ADMIN — HYDROMORPHONE HYDROCHLORIDE 0.5 MILLIGRAM(S): 2 INJECTION INTRAMUSCULAR; INTRAVENOUS; SUBCUTANEOUS at 19:19

## 2022-05-09 RX ADMIN — HYDROMORPHONE HYDROCHLORIDE 0.5 MILLIGRAM(S): 2 INJECTION INTRAMUSCULAR; INTRAVENOUS; SUBCUTANEOUS at 10:53

## 2022-05-09 RX ADMIN — OXYCODONE HYDROCHLORIDE 10 MILLIGRAM(S): 5 TABLET ORAL at 08:54

## 2022-05-09 RX ADMIN — HYDROMORPHONE HYDROCHLORIDE 0.5 MILLIGRAM(S): 2 INJECTION INTRAMUSCULAR; INTRAVENOUS; SUBCUTANEOUS at 14:50

## 2022-05-09 RX ADMIN — OXYCODONE HYDROCHLORIDE 10 MILLIGRAM(S): 5 TABLET ORAL at 00:15

## 2022-05-09 RX ADMIN — METHOCARBAMOL 500 MILLIGRAM(S): 500 TABLET, FILM COATED ORAL at 21:18

## 2022-05-09 RX ADMIN — Medication 975 MILLIGRAM(S): at 14:50

## 2022-05-09 RX ADMIN — Medication 1 APPLICATION(S): at 17:35

## 2022-05-09 RX ADMIN — Medication 50 MILLIGRAM(S): at 13:56

## 2022-05-09 RX ADMIN — BENZOCAINE AND MENTHOL 1 LOZENGE: 5; 1 LIQUID ORAL at 17:10

## 2022-05-09 RX ADMIN — BENZOCAINE AND MENTHOL 1 LOZENGE: 5; 1 LIQUID ORAL at 12:53

## 2022-05-09 RX ADMIN — HYDROMORPHONE HYDROCHLORIDE 0.5 MILLIGRAM(S): 2 INJECTION INTRAMUSCULAR; INTRAVENOUS; SUBCUTANEOUS at 10:20

## 2022-05-09 RX ADMIN — PANTOPRAZOLE SODIUM 40 MILLIGRAM(S): 20 TABLET, DELAYED RELEASE ORAL at 05:33

## 2022-05-09 RX ADMIN — HYDROMORPHONE HYDROCHLORIDE 4 MILLIGRAM(S): 2 INJECTION INTRAMUSCULAR; INTRAVENOUS; SUBCUTANEOUS at 12:53

## 2022-05-09 RX ADMIN — HYDROMORPHONE HYDROCHLORIDE 0.5 MILLIGRAM(S): 2 INJECTION INTRAMUSCULAR; INTRAVENOUS; SUBCUTANEOUS at 02:21

## 2022-05-09 RX ADMIN — Medication 975 MILLIGRAM(S): at 13:54

## 2022-05-09 RX ADMIN — OXYCODONE HYDROCHLORIDE 10 MILLIGRAM(S): 5 TABLET ORAL at 04:14

## 2022-05-09 RX ADMIN — HYDROMORPHONE HYDROCHLORIDE 4 MILLIGRAM(S): 2 INJECTION INTRAMUSCULAR; INTRAVENOUS; SUBCUTANEOUS at 13:50

## 2022-05-09 RX ADMIN — POLYETHYLENE GLYCOL 3350 17 GRAM(S): 17 POWDER, FOR SOLUTION ORAL at 21:16

## 2022-05-09 RX ADMIN — HYDROMORPHONE HYDROCHLORIDE 0.5 MILLIGRAM(S): 2 INJECTION INTRAMUSCULAR; INTRAVENOUS; SUBCUTANEOUS at 23:35

## 2022-05-09 RX ADMIN — Medication 50 MILLIGRAM(S): at 21:18

## 2022-05-09 RX ADMIN — HYDROMORPHONE HYDROCHLORIDE 4 MILLIGRAM(S): 2 INJECTION INTRAMUSCULAR; INTRAVENOUS; SUBCUTANEOUS at 17:10

## 2022-05-09 RX ADMIN — Medication 975 MILLIGRAM(S): at 05:33

## 2022-05-09 RX ADMIN — HYDROMORPHONE HYDROCHLORIDE 4 MILLIGRAM(S): 2 INJECTION INTRAMUSCULAR; INTRAVENOUS; SUBCUTANEOUS at 21:18

## 2022-05-09 RX ADMIN — HYDROMORPHONE HYDROCHLORIDE 0.5 MILLIGRAM(S): 2 INJECTION INTRAMUSCULAR; INTRAVENOUS; SUBCUTANEOUS at 18:55

## 2022-05-09 RX ADMIN — METHOCARBAMOL 500 MILLIGRAM(S): 500 TABLET, FILM COATED ORAL at 13:54

## 2022-05-09 RX ADMIN — OXYCODONE HYDROCHLORIDE 10 MILLIGRAM(S): 5 TABLET ORAL at 05:13

## 2022-05-09 RX ADMIN — HYDROMORPHONE HYDROCHLORIDE 0.5 MILLIGRAM(S): 2 INJECTION INTRAMUSCULAR; INTRAVENOUS; SUBCUTANEOUS at 06:15

## 2022-05-09 RX ADMIN — HYDROMORPHONE HYDROCHLORIDE 4 MILLIGRAM(S): 2 INJECTION INTRAMUSCULAR; INTRAVENOUS; SUBCUTANEOUS at 18:10

## 2022-05-09 RX ADMIN — HYDROMORPHONE HYDROCHLORIDE 0.5 MILLIGRAM(S): 2 INJECTION INTRAMUSCULAR; INTRAVENOUS; SUBCUTANEOUS at 06:45

## 2022-05-09 RX ADMIN — METHOCARBAMOL 500 MILLIGRAM(S): 500 TABLET, FILM COATED ORAL at 05:33

## 2022-05-09 RX ADMIN — HYDROMORPHONE HYDROCHLORIDE 0.5 MILLIGRAM(S): 2 INJECTION INTRAMUSCULAR; INTRAVENOUS; SUBCUTANEOUS at 01:56

## 2022-05-09 RX ADMIN — Medication 975 MILLIGRAM(S): at 22:15

## 2022-05-09 RX ADMIN — Medication 975 MILLIGRAM(S): at 21:18

## 2022-05-09 RX ADMIN — OXYCODONE HYDROCHLORIDE 10 MILLIGRAM(S): 5 TABLET ORAL at 09:55

## 2022-05-09 NOTE — DISCHARGE NOTE PROVIDER - NSDCCPTREATMENT_GEN_ALL_CORE_FT
PRINCIPAL PROCEDURE  Procedure: Anterior cervical discectomy with fusion  Findings and Treatment: c4-6      SECONDARY PROCEDURE  Procedure: Posterior fusion of lumbar spine with laminectomy  Findings and Treatment: L5-S1

## 2022-05-09 NOTE — CONSULT NOTE ADULT - SUBJECTIVE AND OBJECTIVE BOX
Pain Management Consult Note - [facility] [phone]    Chief Complaint: neck pain     HPI: Patient seen and examined today. This is a 38 y/o male PMH with no PMH POD 3 s/p ACDF C5-7, L5-S1 PSF with Dr. Sinha. Patient reports endorsing neck and low back pain since a work related accident in 5/2021. Patient reports endorsing pain in the neck and back since surgery, reports they both hurt equally and at rest is about an 8 out of 10. Patient reports pain increases with any turning or movement. Patient reports Oxycodone does help to decrease the pain, but reports only brings the pain down "from a 10 to an 8". At home, patient denies taking any medications for pain. Patient is istop negative. Patient reports Dilaudid IVP is much more effective than Oxycodone PO but doesn't last long. Patient denies any side effects from current pain regimen.    Pain is __X_ sharp ____dull ___burning ___achy ___ Intensity: ____ mild __X_mod __X_severe     Location _X___surgical site _X___cervical ____X_lumbar ____abd ____upper ext____lower ext    Worse with X____activity __X__movement _____physical therapy___ Rest    Improved with ___X_medication _X___rest ____physical therapy    ROS: Const:  _N__febrile   Eyes:___ENT:___CV: _N__chest pain  Resp: _N___sob  GI:_N__nausea _N__vomiting N___abd pain ___npo ___clears Y__full diet __bm  :___ Musk: _Y__pain ___spasm  Skin:___ Neuro:  __N_sedation__N_confusion_N__ numbness ___weakness _N__paresth  Psych:__anxiety  Endo:___ Heme:___Allergy:_NKDA________, __Y_all others reviewed and negative    PAST MEDICAL & SURGICAL HISTORY:  No pertinent past medical history  No significant past surgical history    SH: __N_Tobacco   __N_Alcohol                          FH:FAMILY HISTORY: NO PERTINENT MEDICAL HISTORY NOTED IN FIRST DEGREE RELATIVES    acetaminophen     Tablet .. 975 milliGRAM(s) Oral every 8 hours  bisacodyl Suppository 10 milliGRAM(s) Rectal once PRN  diphenhydrAMINE 50 milliGRAM(s) Oral every 6 hours PRN  hydrocortisone 2.5% Ointment 1 Application(s) Topical daily  HYDROmorphone   Tablet 2 milliGRAM(s) Oral every 4 hours PRN  HYDROmorphone   Tablet 4 milliGRAM(s) Oral every 4 hours PRN  HYDROmorphone  Injectable 0.5 milliGRAM(s) IV Push every 4 hours PRN  lactated ringers. 1000 milliLiter(s) IV Continuous <Continuous>  magnesium hydroxide Suspension 30 milliLiter(s) Oral daily PRN  methocarbamol 500 milliGRAM(s) Oral every 8 hours  ondansetron Injectable 4 milliGRAM(s) IV Push every 6 hours PRN  pantoprazole    Tablet 40 milliGRAM(s) Oral before breakfast  polyethylene glycol 3350 17 Gram(s) Oral at bedtime  pregabalin 50 milliGRAM(s) Oral three times a day  senna 2 Tablet(s) Oral at bedtime      T(C): 36.5 (05-09-22 @ 08:40), Max: 37.2 (05-09-22 @ 04:20)  HR: 89 (05-09-22 @ 08:40) (85 - 100)  BP: 120/78 (05-09-22 @ 08:40) (120/78 - 140/93)  RR: 18 (05-09-22 @ 08:40) (15 - 18)  SpO2: 97% (05-09-22 @ 08:40) (95% - 98%)  Wt(kg): --    T(C): 36.5 (05-09-22 @ 08:40), Max: 37.2 (05-09-22 @ 04:20)  HR: 89 (05-09-22 @ 08:40) (85 - 100)  BP: 120/78 (05-09-22 @ 08:40) (120/78 - 140/93)  RR: 18 (05-09-22 @ 08:40) (15 - 18)  SpO2: 97% (05-09-22 @ 08:40) (95% - 98%)  Wt(kg): --    T(C): 36.5 (05-09-22 @ 08:40), Max: 37.2 (05-09-22 @ 04:20)  HR: 89 (05-09-22 @ 08:40) (85 - 100)  BP: 120/78 (05-09-22 @ 08:40) (120/78 - 140/93)  RR: 18 (05-09-22 @ 08:40) (15 - 18)  SpO2: 97% (05-09-22 @ 08:40) (95% - 98%)  Wt(kg): --    PHYSICAL EXAM:  Gen Appearance: _X__no acute distress __X_appropriate        Neuro: _X__SILT feet____ EOM Intact Psych: AAOX_3_, _X__mood/affect appropriate        Eyes: X___conjunctiva WNL  ____X_ Pupils equal and round        ENT: _X__ears and nose atraumatic__X_ Hearing grossly intact        Neck: X___trachea midline, no visible masses ___thyroid without palpable mass    Resp: __X_Nml WOB____No tactile fremitus ___clear to auscultation    Cardio: X___extremities free from edema ____pedal pulses palpable    GI/Abdomen: ___soft _____ Nontender___X___Nondistended_____HSM    Lymphatic: ___no palpable nodes in neck  ___no palpable nodes calves and feet    Skin/Wound: ___Incision, _X__Dressing c/d/i,   ____surrounding tissues soft,  ___drain/chest tube present____    Muscular: EHL ___/5  Gastrocnemius___/5    ___Xabsent clubbing/cyanosis      ASSESSMENT: This is a 37y old Male with no PMH POD 3 s/p ACDF C5-7, L5-S1 PSF with Dr. Sinha, with back and neck pain.    Recommended Treatment PLAN:  1. Discontinue Oxycodone 5-10 mg PO q4h PRN moderate-severe pain  2. Start Dilaudid 2-4 mg PO q4h PRN moderate-severe pain  3. Continue Dilaudid 0.5 mg IVP q4h PRN breakthrough pain  4. Continue Tylenol 975 mg PO TID  5. Continue Methocarbamol 500 mg PO TID  6. Continue Lyrica 50 mg PO TID  Plan discussed with Dr. Nichole              
Patient is a 37y old  Male who presents with a chief complaint of neck pain (09 May 2022 10:00)        HPI : 37 year old male with no prior medical hx , not on medications except for back pain and neck pain , he underwent ACDF C4-6, PSF L5-S1 on 05-06.  Medicine team was consulted for post operative hyponatremia , patient currently does not have any symptoms of hyponatremia.    His only complaint is pain with swallowing liquids and solids since surgery , denies dysphagia and food does not get stuck in his throat and he is not regurgitating food .    He has no chest pain , cough , fever , chills , nausea , vomiting , constipation, diarrhea , headache, visual changes , localized weakness         REVIEW OF SYSTEMS: 12 point review of systems negative except those stated else where in the note       PAST MEDICAL & SURGICAL HISTORY:  No pertinent past medical history    No significant past surgical history        Social History:  denies cigarette smoking  occasionally uses marijuana for pain control  per chart review drinks etoh monthly or less (05 May 2022 12:40)      FAMILY HISTORY: No family hx of Early CAD , Stroke , Cancer       Home Medications:      MEDICATIONS  (STANDING):  acetaminophen     Tablet .. 975 milliGRAM(s) Oral every 8 hours  hydrocortisone 2.5% Ointment 1 Application(s) Topical two times a day  methocarbamol 500 milliGRAM(s) Oral every 8 hours  pantoprazole    Tablet 40 milliGRAM(s) Oral before breakfast  polyethylene glycol 3350 17 Gram(s) Oral at bedtime  pregabalin 50 milliGRAM(s) Oral three times a day  senna 2 Tablet(s) Oral at bedtime    MEDICATIONS  (PRN):  benzocaine 15 mG/menthol 3.6 mG Lozenge 1 Lozenge Oral every 2 hours PRN Sore Throat  bisacodyl Suppository 10 milliGRAM(s) Rectal once PRN Constipation  diphenhydrAMINE 50 milliGRAM(s) Oral every 6 hours PRN Rash and/or Itching  HYDROmorphone   Tablet 2 milliGRAM(s) Oral every 4 hours PRN Moderate Pain (4 - 6)  HYDROmorphone   Tablet 4 milliGRAM(s) Oral every 4 hours PRN Severe Pain (7 - 10)  HYDROmorphone  Injectable 0.5 milliGRAM(s) IV Push every 4 hours PRN breakthrough  magnesium hydroxide Suspension 30 milliLiter(s) Oral daily PRN Constipation  ondansetron Injectable 4 milliGRAM(s) IV Push every 6 hours PRN Nausea      Vital Signs Last 24 Hrs  T(C): 36.5 (09 May 2022 08:40), Max: 37.2 (09 May 2022 04:20)  T(F): 97.7 (09 May 2022 08:40), Max: 99 (09 May 2022 04:20)  HR: 83 (09 May 2022 12:45) (81 - 100)  BP: 140/94 (09 May 2022 12:45) (120/78 - 140/94)  BP(mean): --  RR: 18 (09 May 2022 08:40) (15 - 18)  SpO2: 98% (09 May 2022 12:45) (95% - 98%)      05-08-22 @ 07:01  -  05-09-22 @ 07:00  --------------------------------------------------------  IN: 1720 mL / OUT: 1525 mL / NET: 195 mL        LABS:                        13.8   13.46 )-----------( 257      ( 09 May 2022 06:16 )             41.9     05-09    131<L>  |  96  |  9   ----------------------------<  115<H>  3.9   |  23  |  0.88    Ca    8.7      09 May 2022 06:16  CAPILLARY BLOOD GLUCOSE    RADIOLOGY & ADDITIONAL TESTS:    Imaging personally reviewed and radiologists report reviewed     Consultant(s) Notes Reviewed    PHYSICAL EXAM:  GENERAL: not in distress   HEAD, EYES: EOMI, PERRLA, conjunctiva and sclera clear  ENMT:  Moist mucous membranes, Good dentition, No lesions  NECK: Dressing on the left side of the neck   NERVOUS SYSTEM:  Alert & Oriented X3, Good concentration; Motor Strength 5/5 B/L upper and lower extremities; DTRs 2+ intact and symmetric  CHEST/LUNG: Clear to auscultation  bilaterally; No rales, rhonchi, wheezing,   HEART: Regular rate and rhythm; No murmurs, rubs, or gallops  ABDOMEN: Soft, Nontender, Nondistended; Bowel sounds present  EXTREMITIES:  2+ Peripheral Pulses, No clubbing, cyanosis, or edema  LYMPH: No lymphadenopathy noted  SKIN: No rashes or lesions    Care Discussed with Primary team

## 2022-05-09 NOTE — PROVIDER CONTACT NOTE (OTHER) - ASSESSMENT
Pt's lower back is erythematous and warm to touch. Pt was concerned and upset because he doesn't think it's an allergic reaction to back dressing or tape. Hydrocortisone cream was applied.

## 2022-05-09 NOTE — DISCHARGE NOTE PROVIDER - HOSPITAL COURSE
Admitted 5/6  Surgery 5/6 ACDF C4-6, PSF L5-S1  Mari-op Antibiotics  Pain control  DVT prophylaxis  OOB/Physical Therapy   speech and swallow consult

## 2022-05-09 NOTE — DISCHARGE NOTE PROVIDER - NSDCACTIVITY_GEN_ALL_CORE
Do not drive or operate machinery/Stairs allowed/Walking - Indoors allowed/No heavy lifting/straining/Walking - Outdoors allowed/Follow Instructions Provided by your Surgical Team Do not drive or operate machinery/Do not make important decisions/Stairs allowed/Walking - Indoors allowed/No heavy lifting/straining/Walking - Outdoors allowed/Follow Instructions Provided by your Surgical Team

## 2022-05-09 NOTE — DISCHARGE NOTE PROVIDER - CARE PROVIDER_API CALL
Micah Sinha)  Orthopaedic Surgery; Spine Surgery  [address] [place] [address]  Phone: [phone]  Fax: [phone]  Follow Up Time: 2 weeks

## 2022-05-09 NOTE — DISCHARGE NOTE PROVIDER - NSDCFUADDINST_GEN_ALL_CORE_FT
No strenuous activity (bending/twisting), heavy lifting, driving, exercising/gym activities or returning to work until cleared by MD.   Change dressings daily with gauze/tape till post-op day #5, then leave incisions open to air.  You may shower post-op day#5, keep incision clean and dry. No ointments or creams to incision until ok with surgeon.  Try to have regular bowel movements, take stool softener or laxative if necessary.  May apply ice to affected areas to decrease swelling.  Call to schedule an appt with Dr. Sinha for follow up, if you have staples or sutures they will be removed in office.  Contact your doctor if you experience: fever greater than 101.5, chills, chest pain, difficulty breathing, redness or excessive drainage around the incision, other concerns.  Follow up with your primary care provider to recheck sodium level - was down to 131 during hospital stay.  Advance diet as tolerated from puree to regular diet. No strenuous activity (bending/twisting), heavy lifting, driving, exercising/gym activities or returning to work until cleared by MD.   You may shower post-op day#5, keep incisions clean and dry. No ointments or creams to incision until ok with surgeon. do not remove steri strips let them fall off in the shower.  Try to have regular bowel movements, take stool softener or laxative if necessary.  May apply ice to affected areas to decrease swelling.  Call to schedule an appt with Dr. Sinha in 1 week for follow up, if you have staples or sutures they will be removed in office.  Contact your doctor if you experience: fever greater than 101.5, chills, chest pain, difficulty breathing, redness or excessive drainage around the incision, other concerns.  Follow up with your primary care provider to recheck sodium level - was down to 133 during hospital stay.  Advance diet as tolerated from puree to regular diet.

## 2022-05-09 NOTE — SWALLOW BEDSIDE ASSESSMENT ADULT - SWALLOW EVAL: DIAGNOSIS
Functional oswald-pharyngeal swallow on this exam. Severe odynophagia in setting of ACDF with anterior approach

## 2022-05-09 NOTE — DISCHARGE NOTE PROVIDER - NSDCCPCAREPLAN_GEN_ALL_CORE_FT
PRINCIPAL DISCHARGE DIAGNOSIS  Diagnosis: Cervical radiculopathy  Assessment and Plan of Treatment: improvement s/p ACDF C4-6 and PSF L5-S1

## 2022-05-09 NOTE — PROGRESS NOTE ADULT - SUBJECTIVE AND OBJECTIVE BOX
Ortho Floor Note    Subjective:  Complaining of LBP pain and ant c-spine pain, controlled with medication. C/o of rash/itchiness near ioband of Lower back   Denies CP, SOB, N/V, numbness/tingling.    Vital Signs Last 24 Hrs  T(C): 37.2 (09 May 2022 04:20), Max: 37.2 (09 May 2022 04:20)  T(F): 99 (09 May 2022 04:20), Max: 99 (09 May 2022 04:20)  HR: 95 (09 May 2022 04:20) (85 - 100)  BP: 129/80 (09 May 2022 04:20) (129/80 - 140/93)  BP(mean): --  RR: 16 (09 May 2022 04:20) (15 - 17)  SpO2: 96% (09 May 2022 04:20) (95% - 98%)    General: Pt Alert and oriented, NAD  Dressing C/D/I  B/l UE:  Sensation: SILT C5-T1   Motor:   C5 (deltoid abduction) 5/5   C6 (biceps flexion)  5/5   C7 (triceps extension) 5/5   C8 (finger flexion)  5/5   T1 (interosseous)  5/5  B/l LE:  Sensation: SILT L2-S1  Motor:   L2 (hip flexion)  5/5   L3 (knee extension)  5/5   L4 (ankle dorsiflexion)  5/5   L5 (long toe extension) 5/5   S1 (ankle plantar flexion) 5/5    LABS/RADIOLOGY RESULTS:                          12.5   11.69 )-----------( 227      ( 08 May 2022 07:01 )             39.3   05-08    133<L>  |  98  |  10  ----------------------------<  106<H>  3.7   |  26  |  0.95    Ca    8.4      08 May 2022 07:01    Blood Cultures      A/P: 37yMale s/p ACDF C4-6, PSF L5-S1 on 05-06.  - Stable  - re lower back itchiness/erythema - lumbar dsg changed to gauze/teggy, benadryl PRN, continue to monitor   - Pain Control  - DVT ppx: SCDs  - PT, WBS: WBAT  - Dispo: Home pending PT clearance     Ortho Pager [phone]

## 2022-05-09 NOTE — CONSULT NOTE ADULT - ASSESSMENT
38 yo male admitted to the hospital for neck pain and back pain     Impression and plan   # ACDF C4-C6, PSF  L5-S1 on 5/6/22  - pain control , DVT prophylaxis , Weightbearing status , Dressing changes and drain care per ortho team    # Odynophagia after ACDF   - Speech therapy eval  - no signs of dysphagia     # Asymptomatic Hyponatremia  - check urine Na, Urine Osm   - pain control  - suspect SIADH from pain and nausea   - DC iv fluids

## 2022-05-09 NOTE — SWALLOW BEDSIDE ASSESSMENT ADULT - NS SPL SWALLOW CLINIC TRIAL FT
Pt presents with functional oswald-pharyngeal swallow on this exam with no overt signs of airway protection deficits. Pt with severe post surgical odynophagia - 10/10. Continue current diet. Pain management. Can advance as pain management improves without a re-assessment by this service. No further follow up is warranted at this time.

## 2022-05-09 NOTE — PROGRESS NOTE ADULT - SUBJECTIVE AND OBJECTIVE BOX
Ortho Note    Pt comfortable, pain controlled. c/o back rash says maybe from tegaderm. Still c/o sore throat with foods.  Denies CP, SOB, N/V, numbness/tingling.     Vital Signs Last 24 Hrs  T(C): 36.5 (05-09-22 @ 14:40), Max: 36.5 (05-09-22 @ 14:40)  T(F): 97.7 (05-09-22 @ 14:40), Max: 97.7 (05-09-22 @ 14:40)  HR: 92 (05-09-22 @ 14:40) (81 - 92)  BP: 138/93 (05-09-22 @ 14:40) (124/79 - 140/94)  BP(mean): --  RR: 17 (05-09-22 @ 14:40) (17 - 17)  SpO2: 98% (05-09-22 @ 14:40) (96% - 98%)  I&O's Summary    08 May 2022 07:01  -  09 May 2022 07:00  --------------------------------------------------------  IN: 1720 mL / OUT: 1525 mL / NET: 195 mL    09 May 2022 07:01  -  09 May 2022 15:31  --------------------------------------------------------  IN: 700 mL / OUT: 1200 mL / NET: -500 mL        General: Pt Alert and oriented, NAD  Neck and back dsg changed to medipore's  Pulses intact b/l UE/LE  Sensation intact b/l UE/LE  Motor: /bicep/tricep/quad/ham/EHL/FHL/TA/GS 5/5 b/l                          13.8   13.46 )-----------( 257      ( 09 May 2022 06:16 )             41.9     05-09    131<L>  |  96  |  9   ----------------------------<  115<H>  3.9   |  23  |  0.88    Ca    8.7      09 May 2022 06:16        A/P: 37yMale POD#3 s/p ACDF C4-6 and PSF L5-S1  - Stable  - Pain Control  - DVT ppx: scds  - PT, WBS: wbat  - speech and swallow recs appreciated  - medicine recs appreciated  - f/u Urine lytes for hyponatremia, IVF d/c'd    Ortho Pager [phone]

## 2022-05-09 NOTE — DISCHARGE NOTE PROVIDER - NSDCMRMEDTOKEN_GEN_ALL_CORE_FT
acetaminophen 325 mg oral tablet: 3 tab(s) orally every 8 hours  hydrocortisone 2.5% topical ointment: 1 application topically to affected area 2 times a day MDD:2  HYDROmorphone 2 mg oral tablet: 1 tab(s) orally every 4 to 6 hours, As Needed -Moderate to Severe Pain MDD:6  methocarbamol 500 mg oral tablet: 1 tab(s) orally every 8 hours MDD:3  pregabalin 50 mg oral capsule: 1 cap(s) orally 3 times a day MDD:3   acetaminophen 325 mg oral tablet: 3 tab(s) orally every 8 hours  hydrocortisone 2.5% topical ointment: 1 application topically to affected area 2 times a day MDD:2  HYDROmorphone 2 mg oral tablet: 1 tab(s) orally every 4 to 6 hours, As Needed -Moderate to Severe Pain MDD:6  methocarbamol 500 mg oral tablet: 1 tab(s) orally every 8 hours MDD:3  pregabalin 50 mg oral capsule: 1 cap(s) orally 3 times a day MDD:3  sulfamethoxazole-trimethoprim 800 mg-160 mg oral tablet: 1 tab(s) orally 2 times a day   acetaminophen 500 mg oral tablet: 2 tab(s) orally every 8 hours, As Needed -for mild pain   HYDROmorphone 2 mg oral tablet: 1 tab(s) orally every 4 to 6 hours, As Needed -Moderate to Severe Pain MDD:6  methocarbamol 500 mg oral tablet: 1 tab(s) orally every 8 hours MDD:3  pregabalin 50 mg oral capsule: 1 cap(s) orally 3 times a day MDD:3  sulfamethoxazole-trimethoprim 800 mg-160 mg oral tablet: 1 tab(s) orally 2 times a day

## 2022-05-10 ENCOUNTER — TRANSCRIPTION ENCOUNTER (OUTPATIENT)
Age: 37
End: 2022-05-10

## 2022-05-10 VITALS
OXYGEN SATURATION: 97 % | TEMPERATURE: 98 F | DIASTOLIC BLOOD PRESSURE: 75 MMHG | SYSTOLIC BLOOD PRESSURE: 111 MMHG | HEART RATE: 85 BPM | RESPIRATION RATE: 18 BRPM

## 2022-05-10 LAB
ANION GAP SERPL CALC-SCNC: 12 MMOL/L — SIGNIFICANT CHANGE UP (ref 5–17)
BUN SERPL-MCNC: 11 MG/DL — SIGNIFICANT CHANGE UP (ref 7–23)
CALCIUM SERPL-MCNC: 8.8 MG/DL — SIGNIFICANT CHANGE UP (ref 8.4–10.5)
CHLORIDE SERPL-SCNC: 96 MMOL/L — SIGNIFICANT CHANGE UP (ref 96–108)
CO2 SERPL-SCNC: 25 MMOL/L — SIGNIFICANT CHANGE UP (ref 22–31)
CREAT SERPL-MCNC: 0.78 MG/DL — SIGNIFICANT CHANGE UP (ref 0.5–1.3)
EGFR: 117 ML/MIN/1.73M2 — SIGNIFICANT CHANGE UP
GLUCOSE SERPL-MCNC: 110 MG/DL — HIGH (ref 70–99)
HCT VFR BLD CALC: 40.1 % — SIGNIFICANT CHANGE UP (ref 39–50)
HGB BLD-MCNC: 13.2 G/DL — SIGNIFICANT CHANGE UP (ref 13–17)
MCHC RBC-ENTMCNC: 27.2 PG — SIGNIFICANT CHANGE UP (ref 27–34)
MCHC RBC-ENTMCNC: 32.9 GM/DL — SIGNIFICANT CHANGE UP (ref 32–36)
MCV RBC AUTO: 82.5 FL — SIGNIFICANT CHANGE UP (ref 80–100)
NRBC # BLD: 0 /100 WBCS — SIGNIFICANT CHANGE UP (ref 0–0)
PLATELET # BLD AUTO: 278 K/UL — SIGNIFICANT CHANGE UP (ref 150–400)
POTASSIUM SERPL-MCNC: 3.6 MMOL/L — SIGNIFICANT CHANGE UP (ref 3.5–5.3)
POTASSIUM SERPL-SCNC: 3.6 MMOL/L — SIGNIFICANT CHANGE UP (ref 3.5–5.3)
RBC # BLD: 4.86 M/UL — SIGNIFICANT CHANGE UP (ref 4.2–5.8)
RBC # FLD: 12.6 % — SIGNIFICANT CHANGE UP (ref 10.3–14.5)
SODIUM SERPL-SCNC: 133 MMOL/L — LOW (ref 135–145)
WBC # BLD: 11 K/UL — HIGH (ref 3.8–10.5)
WBC # FLD AUTO: 11 K/UL — HIGH (ref 3.8–10.5)

## 2022-05-10 PROCEDURE — 99233 SBSQ HOSP IP/OBS HIGH 50: CPT

## 2022-05-10 RX ORDER — ACETAMINOPHEN 500 MG
2 TABLET ORAL
Qty: 42 | Refills: 0
Start: 2022-05-10 | End: 2022-05-16

## 2022-05-10 RX ORDER — METHOCARBAMOL 500 MG/1
1 TABLET, FILM COATED ORAL
Qty: 21 | Refills: 0
Start: 2022-05-10 | End: 2022-05-16

## 2022-05-10 RX ORDER — HYDROMORPHONE HYDROCHLORIDE 2 MG/ML
4 INJECTION INTRAMUSCULAR; INTRAVENOUS; SUBCUTANEOUS
Refills: 0 | Status: DISCONTINUED | OUTPATIENT
Start: 2022-05-10 | End: 2022-05-10

## 2022-05-10 RX ORDER — HYDROMORPHONE HYDROCHLORIDE 2 MG/ML
2 INJECTION INTRAMUSCULAR; INTRAVENOUS; SUBCUTANEOUS
Refills: 0 | Status: DISCONTINUED | OUTPATIENT
Start: 2022-05-10 | End: 2022-05-10

## 2022-05-10 RX ORDER — HYDROMORPHONE HYDROCHLORIDE 2 MG/ML
1 INJECTION INTRAMUSCULAR; INTRAVENOUS; SUBCUTANEOUS
Qty: 42 | Refills: 0
Start: 2022-05-10 | End: 2022-05-16

## 2022-05-10 RX ORDER — HYDROCORTISONE 1 %
1 OINTMENT (GRAM) TOPICAL
Qty: 1 | Refills: 0
Start: 2022-05-10 | End: 2022-05-16

## 2022-05-10 RX ORDER — ACETAMINOPHEN 500 MG
3 TABLET ORAL
Qty: 0 | Refills: 0 | DISCHARGE
Start: 2022-05-10

## 2022-05-10 RX ADMIN — Medication 50 MILLIGRAM(S): at 13:53

## 2022-05-10 RX ADMIN — HYDROMORPHONE HYDROCHLORIDE 0.5 MILLIGRAM(S): 2 INJECTION INTRAMUSCULAR; INTRAVENOUS; SUBCUTANEOUS at 03:38

## 2022-05-10 RX ADMIN — METHOCARBAMOL 500 MILLIGRAM(S): 500 TABLET, FILM COATED ORAL at 13:53

## 2022-05-10 RX ADMIN — METHOCARBAMOL 500 MILLIGRAM(S): 500 TABLET, FILM COATED ORAL at 06:17

## 2022-05-10 RX ADMIN — HYDROMORPHONE HYDROCHLORIDE 0.5 MILLIGRAM(S): 2 INJECTION INTRAMUSCULAR; INTRAVENOUS; SUBCUTANEOUS at 09:00

## 2022-05-10 RX ADMIN — HYDROMORPHONE HYDROCHLORIDE 4 MILLIGRAM(S): 2 INJECTION INTRAMUSCULAR; INTRAVENOUS; SUBCUTANEOUS at 17:30

## 2022-05-10 RX ADMIN — HYDROMORPHONE HYDROCHLORIDE 4 MILLIGRAM(S): 2 INJECTION INTRAMUSCULAR; INTRAVENOUS; SUBCUTANEOUS at 06:16

## 2022-05-10 RX ADMIN — Medication 975 MILLIGRAM(S): at 07:00

## 2022-05-10 RX ADMIN — HYDROMORPHONE HYDROCHLORIDE 4 MILLIGRAM(S): 2 INJECTION INTRAMUSCULAR; INTRAVENOUS; SUBCUTANEOUS at 17:00

## 2022-05-10 RX ADMIN — HYDROMORPHONE HYDROCHLORIDE 0.5 MILLIGRAM(S): 2 INJECTION INTRAMUSCULAR; INTRAVENOUS; SUBCUTANEOUS at 08:37

## 2022-05-10 RX ADMIN — Medication 1 APPLICATION(S): at 06:17

## 2022-05-10 RX ADMIN — Medication 50 MILLIGRAM(S): at 06:16

## 2022-05-10 RX ADMIN — HYDROMORPHONE HYDROCHLORIDE 4 MILLIGRAM(S): 2 INJECTION INTRAMUSCULAR; INTRAVENOUS; SUBCUTANEOUS at 07:00

## 2022-05-10 RX ADMIN — Medication 975 MILLIGRAM(S): at 06:16

## 2022-05-10 RX ADMIN — PANTOPRAZOLE SODIUM 40 MILLIGRAM(S): 20 TABLET, DELAYED RELEASE ORAL at 06:16

## 2022-05-10 RX ADMIN — HYDROMORPHONE HYDROCHLORIDE 4 MILLIGRAM(S): 2 INJECTION INTRAMUSCULAR; INTRAVENOUS; SUBCUTANEOUS at 13:55

## 2022-05-10 RX ADMIN — Medication 975 MILLIGRAM(S): at 14:00

## 2022-05-10 RX ADMIN — HYDROMORPHONE HYDROCHLORIDE 0.5 MILLIGRAM(S): 2 INJECTION INTRAMUSCULAR; INTRAVENOUS; SUBCUTANEOUS at 04:00

## 2022-05-10 RX ADMIN — Medication 975 MILLIGRAM(S): at 13:53

## 2022-05-10 RX ADMIN — HYDROMORPHONE HYDROCHLORIDE 4 MILLIGRAM(S): 2 INJECTION INTRAMUSCULAR; INTRAVENOUS; SUBCUTANEOUS at 01:07

## 2022-05-10 RX ADMIN — HYDROMORPHONE HYDROCHLORIDE 4 MILLIGRAM(S): 2 INJECTION INTRAMUSCULAR; INTRAVENOUS; SUBCUTANEOUS at 14:30

## 2022-05-10 RX ADMIN — Medication 1 TABLET(S): at 17:00

## 2022-05-10 RX ADMIN — HYDROMORPHONE HYDROCHLORIDE 4 MILLIGRAM(S): 2 INJECTION INTRAMUSCULAR; INTRAVENOUS; SUBCUTANEOUS at 02:00

## 2022-05-10 RX ADMIN — HYDROMORPHONE HYDROCHLORIDE 0.5 MILLIGRAM(S): 2 INJECTION INTRAMUSCULAR; INTRAVENOUS; SUBCUTANEOUS at 00:30

## 2022-05-10 NOTE — DISCHARGE NOTE NURSING/CASE MANAGEMENT/SOCIAL WORK - PATIENT PORTAL LINK FT
You can access the FollowMyHealth Patient Portal offered by [facility] by registering at the following website: [email or web address]. By joining [employer]s FollowMyHealth portal, you will also be able to view your health information using other applications (apps) compatible with our system.

## 2022-05-10 NOTE — DISCHARGE NOTE NURSING/CASE MANAGEMENT/SOCIAL WORK - NSDCPEFALRISK_GEN_ALL_CORE
For information on Fall & Injury Prevention, visit: [email or web address] OR  [email or web address] OR  https://www.cdc.gov/steadi/patient.html

## 2022-05-10 NOTE — DISCHARGE NOTE NURSING/CASE MANAGEMENT/SOCIAL WORK - NSDCPETBCESMAN_GEN_ALL_CORE
If you are a smoker, it is important for your health to stop smoking. Please be aware that second hand smoke is also harmful. Yes, record another set of vital signs

## 2022-05-10 NOTE — DISCHARGE NOTE NURSING/CASE MANAGEMENT/SOCIAL WORK - NURSING SECTION COMPLETE
After birth date and last name were verified, patient notified that medications were refilled and she can just follow up in the spring.  Marva Medina CMA ([facility])    Patient/Caregiver provided printed discharge information.

## 2022-05-10 NOTE — PROGRESS NOTE ADULT - SUBJECTIVE AND OBJECTIVE BOX
Patient is a 37y old  Male who presents with a chief complaint of neck pain (10 May 2022 11:57)        SUBJECTIVE:  Patient was seen and examined at bedside.    Overnight Events : feeling better , reports rash on left flank , difficulty with urination without pain ,  no fever , no chills , no drainage from surgical site       Review of systems: 12 point Review of systems negative unless otherwise documented elsewhere in note.     Diet, Regular (05-09-22 @ 18:14) [Active]      MEDICATIONS:  MEDICATIONS  (STANDING):  acetaminophen     Tablet .. 975 milliGRAM(s) Oral every 8 hours  bisacodyl Suppository 10 milliGRAM(s) Rectal daily  hydrocortisone 2.5% Ointment 1 Application(s) Topical two times a day  methocarbamol 500 milliGRAM(s) Oral every 8 hours  pantoprazole    Tablet 40 milliGRAM(s) Oral before breakfast  polyethylene glycol 3350 17 Gram(s) Oral at bedtime  pregabalin 50 milliGRAM(s) Oral three times a day  saline laxative (FLEET) Rectal Enema 1 Enema Rectal once  senna 2 Tablet(s) Oral at bedtime  trimethoprim  160 mG/sulfamethoxazole 800 mG 1 Tablet(s) Oral two times a day    MEDICATIONS  (PRN):  benzocaine 15 mG/menthol 3.6 mG Lozenge 1 Lozenge Oral every 2 hours PRN Sore Throat  bisacodyl Suppository 10 milliGRAM(s) Rectal once PRN Constipation  diphenhydrAMINE 50 milliGRAM(s) Oral every 6 hours PRN Rash and/or Itching  HYDROmorphone   Tablet 2 milliGRAM(s) Oral every 3 hours PRN Moderate Pain (4 - 6)  HYDROmorphone   Tablet 4 milliGRAM(s) Oral every 3 hours PRN Severe Pain (7 - 10)  HYDROmorphone  Injectable 0.5 milliGRAM(s) IV Push every 4 hours PRN breakthrough  magnesium hydroxide Suspension 30 milliLiter(s) Oral daily PRN Constipation  ondansetron Injectable 4 milliGRAM(s) IV Push every 6 hours PRN Nausea      Allergies    No Known Allergies    Intolerances        OBJECTIVE:  Vital Signs Last 24 Hrs  T(C): 36.6 (10 May 2022 13:06), Max: 37.1 (09 May 2022 21:05)  T(F): 97.9 (10 May 2022 13:06), Max: 98.7 (09 May 2022 21:05)  HR: 85 (10 May 2022 13:06) (80 - 92)  BP: 111/75 (10 May 2022 13:06) (111/75 - 142/95)  BP(mean): --  RR: 18 (10 May 2022 13:06) (16 - 20)  SpO2: 97% (10 May 2022 13:06) (97% - 99%)  I&O's Summary    09 May 2022 07:01  -  10 May 2022 07:00  --------------------------------------------------------  IN: 1400 mL / OUT: 2750 mL / NET: -1350 mL    10 May 2022 07:01  -  10 May 2022 14:30  --------------------------------------------------------  IN: 0 mL / OUT: 900 mL / NET: -900 mL        PHYSICAL EXAM:  Gen: Resting in bed at time of exam, not in distress   HEENT: moist mucosa, no lesions   Neck: supple, trachea at midline  CV: RRR, +S1/S2  Pulm: no wheezing , no crackles  no increase in work of breathing  Abd: soft, NTND  Skin: warm and dry, no new rashes   Ext: moving all 4 extremities spontaneously , no edema  ,  Neuro: AOx3, no gross focal neurological deficits  Psych: affect and behavior appropriate, pleasant at time of interview    LABS:                        13.2   11.00 )-----------( 278      ( 10 May 2022 05:57 )             40.1     05-10    133<L>  |  96  |  11  ----------------------------<  110<H>  3.6   |  25  |  0.78    Ca    8.8      10 May 2022 05:57          CAPILLARY BLOOD GLUCOSE            MICRODATA:      RADIOLOGY/OTHER STUDIES:

## 2022-05-10 NOTE — PROGRESS NOTE ADULT - SUBJECTIVE AND OBJECTIVE BOX
Pain Management Progress Note - [facility] [phone]    HPI: Patient seen and examined today. Patient reports doing well today, reports endorsing less back and neck pain this AM. Patient reports better pain control with PO Dilaudid compared to PO Oxycodone. Patient denies any side effects from current pain regimen.     Pertinent PMH: Pain at: __X_Back X___Neck___Knee ___Hip ___Shoulder ___ Opioid tolerance    Pain is __X_ sharp ____dull ___burning ___achy ___ Intensity: ____ mild ___X_mod __X__severe     Location ____X_surgical site ___X__cervical ____X_lumbar ____abd _____upper ext____lower ext    Worse with __X__activity ___X_movement _____physical therapy___ Rest    Improved with __X__medication _X___rest ____physical therapy    PAST MEDICAL & SURGICAL HISTORY:  No pertinent past medical history  No significant past surgical history    MEDICATIONS:  HYDROmorphone   Tablet  HYDROmorphone   Tablet  bisacodyl Suppository  saline laxative (FLEET) Rectal Enema  thrombin 5,000 Unit(s) Vial (Rx Quick Charge)  benzocaine 15 mG/menthol 3.6 mG Lozenge  hydrocortisone 2.5% Ointment  HYDROmorphone   Tablet  HYDROmorphone   Tablet  hydrocortisone 2.5% Ointment  diphenhydrAMINE  polyethylene glycol 3350  magnesium hydroxide Suspension  bisacodyl Suppository  pantoprazole    Tablet  pregabalin  methocarbamol  HYDROmorphone  Injectable  oxyCODONE    IR  oxyCODONE    IR  HYDROmorphone  Injectable  acetaminophen     Tablet ..  senna  ondansetron Injectable  vancomycin  IVPB  acetaminophen     Tablet ..  lactated ringers.  gabapentin  aprepitant  acetaminophen     Tablet ..  chlorhexidine 2% Cloths  povidone iodine 5% Nasal Swab    ROS: Const:  _N__febrile   Eyes:___ENT:___CV: _N__chest pain  Resp: ___N_sob  GI:__N_nausea __N_vomiting _N___abd pain ___npo ___clears __Y_full diet __bm  :___ Musk: __Y_pain ___spasm  Skin:___ Neuro:  _N__sedation__N_confusion__N__ numbness ___weakness _N__paresthesia  Psych:__N_anxiety  Endo:___ Heme:___Allergy:__NKDA_      05-10 @ 05:570.78 mg/dL  Hemoglobin: 13.2 g/dL (05-10 @ 05:57)  Hemoglobin: 13.8 g/dL (05-09 @ 06:16)  T(C): 36.4 (05-10-22 @ 06:12), Max: 37.1 (05-09-22 @ 21:05)  HR: 80 (05-10-22 @ 06:12) (80 - 92)  BP: 125/86 (05-10-22 @ 06:12) (124/79 - 142/95)  RR: 20 (05-10-22 @ 06:12) (16 - 20)  SpO2: 97% (05-10-22 @ 06:12) (96% - 99%)  Wt(kg): --     PHYSICAL EXAM:  Gen Appearance: __X_no acute distress __X_appropriate       Neuro: ___SILT feet____ EOM Intact Psych: AAOX3__, _X__mood/affect appropriate        Eyes: _X__conjunctiva WNL  __X___ Pupils equal and round        ENT: __X_ears and nose atraumatic__X_ Hearing grossly intact        Neck: _X__trachea midline, no visible masses ___thyroid without palpable mass    Resp: _X__Nml WOB____No tactile fremitus ___clear to auscultation    Cardio: _X__extremities free from edema ____pedal pulses palpable    GI/Abdomen: ___soft _____ Nontender___X___Nondistended_____HSM    Lymphatic: ___no palpable nodes in neck  ___no palpable nodes calves and feet    Skin/Wound: ___Incision, ___Dressing c/d/i,   ____surrounding tissues soft,  ___drain/chest tube present____    Muscular: EHL ___/5  Gastrocnemius___/5    __X_absent clubbing/cyanosis         ASSESSMENT:  This is a 37y old Male with no PMH POD 4 s/p ACDF C5-7, L5-S1 PSF with Dr. Sinha, with back and neck pain, doing well.    Recommended Treatment PLAN:  1. Increase to Dilaudid 2-4 mg PO q3h PRN moderate-severe pain  2. Dilaudid 0.5 mg IVP q4h PRN breakthrough pain  3. Tylenol 975 mg PO TID  4. Methocarbamol 500 mg PO TID  5. Lyrica 50 mg PO TID  Plan discussed with Dr. Nichole

## 2022-05-10 NOTE — PROGRESS NOTE ADULT - SUBJECTIVE AND OBJECTIVE BOX
Ortho Floor Note    Subjective:  States that LBP and c spine pain improving, but continues to endorse flank itchiness b/l    Denies CP, SOB, N/V, numbness/tingling.    Vital Signs Last 24 Hrs  T(C): 36.4 (10 May 2022 06:12), Max: 37.1 (09 May 2022 21:05)  T(F): 97.5 (10 May 2022 06:12), Max: 98.7 (09 May 2022 21:05)  HR: 80 (10 May 2022 06:12) (80 - 92)  BP: 125/86 (10 May 2022 06:12) (120/78 - 142/95)  BP(mean): --  RR: 20 (10 May 2022 06:12) (16 - 20)  SpO2: 97% (10 May 2022 06:12) (96% - 99%)    General: Pt Alert and oriented, NAD  Dressing C/D/I  B/l UE:  Sensation: SILT C5-T1   Motor:   C5 (deltoid abduction) 5/5   C6 (biceps flexion)  5/5   C7 (triceps extension) 5/5   C8 (finger flexion)  5/5   T1 (interosseous)  5/5  B/l LE:  Sensation: SILT L2-S1  Motor:   L2 (hip flexion)  5/5   L3 (knee extension)  5/5   L4 (ankle dorsiflexion)  5/5   L5 (long toe extension) 5/5   S1 (ankle plantar flexion) 5/5    LABS/RADIOLOGY RESULTS:                          13.2   11.00 )-----------( 278      ( 10 May 2022 05:57 )             40.1   05-10    133<L>  |  96  |  11  ----------------------------<  110<H>  3.6   |  25  |  0.78    Ca    8.8      10 May 2022 05:57    Blood Cultures      A/P: 37yMale s/p ACDF C4-6, PSF L5-S1 on 05-06.  - Stable  - re lower back itchiness/erythema - lumbar dsg changed to medipore, hydrocortisone applied, if pt continues to endorse pain/itching, ok to remove dsg and leave incision to air, appreciate med rec re further mgmt of skin erythema, possible derm consult for further eval of flank erythema near incision site   - Pain Control  - DVT ppx: SCDs  - PT, WBS: WBAT  - Dispo: Home pending PT clearance and resolution of flank erythema     Ortho Pager [phone]

## 2022-05-10 NOTE — PROGRESS NOTE ADULT - PROVIDER SPECIALTY LIST ADULT
Orthopedics
Internal Medicine
Orthopedics
Orthopedics
Pain Medicine

## 2022-05-10 NOTE — PROGRESS NOTE ADULT - ASSESSMENT
38 yo male admitted to the hospital for neck pain and back pain     Impression and plan   # ACDF C4-C6, PSF  L5-S1 on 5/6/22  - pain control , DVT prophylaxis , Weightbearing status , Dressing changes and drain care per ortho team    # Odynophagia after ACDF   - Speech therapy eval  - no signs of dysphagia     # Asymptomatic Hyponatremia  - resolving , recheck with pcp in 1 week     # Urinary hesitancy most likely due to opioids , voiding spontaneously , PVR < 250ml , f/u with pcp if hesitancy persists after opioids are discontinued     # Left Flank erythema + rash ? Cellulitis , rash close to surgical site , no purulence at this time , however given recent spine surgery , will recommend Bactrim DS BID for 7-10 days and out patient f/u with pcp and with surgeon within 1 week

## 2022-05-10 NOTE — PROGRESS NOTE ADULT - SUBJECTIVE AND OBJECTIVE BOX
POST OPERATIVE DAY # 4  STATUS POST: ACDF C4-C6, PSF L5-S1                       SUBJECTIVE: Patient seen and examined at bedside. Patient describes moderate itching of rash around dressing site. He also reports having to strain to initiate urination.  Pain is currently well controlled. Denies sense of incomplete bladder emptying, dysuria, suprapubic pain, sob/cp/n/v/numbness or tingling in b/l upper and lower extremities     OBJECTIVE:     Vital Signs Last 24 Hrs  T(C): 36.4 (10 May 2022 06:12), Max: 37.1 (09 May 2022 21:05)  T(F): 97.5 (10 May 2022 06:12), iMax: 98.7 (09 May 2022 21:05)  HR: 80 (10 May 2022 09:19) (80 - 92)  BP: 138/91 (10 May 2022 09:19) (124/79 - 142/95)  BP(mean): --  RR: 20 (10 May 2022 08:35) (16 - 20)  SpO2: 97% (10 May 2022 08:35) (96% - 99%)    Spine:        Anterior Dressing: clean/dry/intact        Posterior Dressing: clean/dry/ intact - Medipore dressing removed.          Sensation: intact to light touch to patient's baseline         Motor exam: 5/5 bilateral strength quadriceps/hamstrings/FHL/EHL/TA/GS. 5/5 strength bilateral biceps/triceps/fingers/thumb/.   Pulses: 2+ bilateral DP/PT             I&O's Detail    09 May 2022 07:01  -  10 May 2022 07:00  --------------------------------------------------------  IN:    Oral Fluid: 1400 mL  Total IN: 1400 mL    OUT:    Voided (mL): 2750 mL  Total OUT: 2750 mL    Total NET: -1350 mL      10 May 2022 07:01  -  10 May 2022 11:58  --------------------------------------------------------  IN:  Total IN: 0 mL    OUT:    Voided (mL): 900 mL  Total OUT: 900 mL    Total NET: -900 mL          LABS:                        13.2   11.00 )-----------( 278      ( 10 May 2022 05:57 )             40.1     05-10    133<L>  |  96  |  11  ----------------------------<  110<H>  3.6   |  25  |  0.78    Ca    8.8      10 May 2022 05:57            MEDICATIONS:    acetaminophen     Tablet .. 975 milliGRAM(s) Oral every 8 hours  diphenhydrAMINE 50 milliGRAM(s) Oral every 6 hours PRN  HYDROmorphone   Tablet 2 milliGRAM(s) Oral every 3 hours PRN  HYDROmorphone   Tablet 4 milliGRAM(s) Oral every 3 hours PRN  HYDROmorphone  Injectable 0.5 milliGRAM(s) IV Push every 4 hours PRN  methocarbamol 500 milliGRAM(s) Oral every 8 hours  ondansetron Injectable 4 milliGRAM(s) IV Push every 6 hours PRN  pregabalin 50 milliGRAM(s) Oral three times a day          ASSESSMENT AND PLAN: 36yo Male s/p ACDF C4-C6, PSF L5-S1 by Dr. Sinha on 05/06.     1. Analgesic pain control  2. DVT prophylaxis:   SCDs - Patient requested to have removed. Educated and informed of increased risk of blood clots post operatively.     3. Weight Bearing Status:  Weight bearing as tolerated   4. Disposition: Home Today   5. Back Rash - Keflex x 7 days- per medicine recommendation.   6. Urinary Retention- PVR completed.  POST OPERATIVE DAY # 4  STATUS POST: ACDF C4-C6, PSF L5-S1                       SUBJECTIVE: Patient seen and examined at bedside. Patient describes moderate itching of rash around dressing site. He also reports having to strain to initiate urination.  Pain is currently well controlled. Denies sense of incomplete bladder emptying, dysuria, suprapubic pain, sob/cp/n/v/numbness or tingling in b/l upper and lower extremities     OBJECTIVE:     Vital Signs Last 24 Hrs  T(C): 36.4 (10 May 2022 06:12), Max: 37.1 (09 May 2022 21:05)  T(F): 97.5 (10 May 2022 06:12), iMax: 98.7 (09 May 2022 21:05)  HR: 80 (10 May 2022 09:19) (80 - 92)  BP: 138/91 (10 May 2022 09:19) (124/79 - 142/95)  BP(mean): --  RR: 20 (10 May 2022 08:35) (16 - 20)  SpO2: 97% (10 May 2022 08:35) (96% - 99%)    General: No acute distress, patient lying comfortably in bed.   Spine:        Anterior Dressing: clean/dry/intact        Posterior Dressing: clean/dry/ intact - Medipore dressing removed.          Sensation: intact to light touch to patient's baseline         Motor exam: 5/5 bilateral strength quadriceps/hamstrings/FHL/EHL/TA/GS. 5/5 strength bilateral biceps/triceps/fingers/thumb/.   Pulses: 2+ bilateral DP/PT             I&O's Detail    09 May 2022 07:01  -  10 May 2022 07:00  --------------------------------------------------------  IN:    Oral Fluid: 1400 mL  Total IN: 1400 mL    OUT:    Voided (mL): 2750 mL  Total OUT: 2750 mL    Total NET: -1350 mL      10 May 2022 07:01  -  10 May 2022 11:58  --------------------------------------------------------  IN:  Total IN: 0 mL    OUT:    Voided (mL): 900 mL  Total OUT: 900 mL    Total NET: -900 mL          LABS:                        13.2   11.00 )-----------( 278      ( 10 May 2022 05:57 )             40.1     05-10    133<L>  |  96  |  11  ----------------------------<  110<H>  3.6   |  25  |  0.78    Ca    8.8      10 May 2022 05:57            MEDICATIONS:    acetaminophen     Tablet .. 975 milliGRAM(s) Oral every 8 hours  diphenhydrAMINE 50 milliGRAM(s) Oral every 6 hours PRN  HYDROmorphone   Tablet 2 milliGRAM(s) Oral every 3 hours PRN  HYDROmorphone   Tablet 4 milliGRAM(s) Oral every 3 hours PRN  HYDROmorphone  Injectable 0.5 milliGRAM(s) IV Push every 4 hours PRN  methocarbamol 500 milliGRAM(s) Oral every 8 hours  ondansetron Injectable 4 milliGRAM(s) IV Push every 6 hours PRN  pregabalin 50 milliGRAM(s) Oral three times a day          ASSESSMENT AND PLAN: 38yo Male s/p ACDF C4-C6, PSF L5-S1 by Dr. Sinha on 05/06.     1. Analgesic pain control  2. DVT prophylaxis:   SCDs - Patient requested to have removed. Educated and informed of increased risk of blood clots post operatively.     3. Weight Bearing Status:  Weight bearing as tolerated   4. Disposition: Home Today   5. Back Rash - Bactrim x 7 days- per medicine recommendation.   6. Urinary Retention- PVR completed, trial to void passed.  POST OPERATIVE DAY # 4  STATUS POST: ACDF C4-C6, PSF L5-S1                       SUBJECTIVE: Patient seen and examined at bedside. Patient describes moderate itching of rash around dressing site. He also reports having to strain to initiate urination.  Pain is currently well controlled. Denies sense of incomplete bladder emptying, dysuria, suprapubic pain, sob/cp/n/v/numbness or tingling in b/l upper and lower extremities     OBJECTIVE:     Vital Signs Last 24 Hrs  T(C): 36.4 (10 May 2022 06:12), Max: 37.1 (09 May 2022 21:05)  T(F): 97.5 (10 May 2022 06:12), iMax: 98.7 (09 May 2022 21:05)  HR: 80 (10 May 2022 09:19) (80 - 92)  BP: 138/91 (10 May 2022 09:19) (124/79 - 142/95)  BP(mean): --  RR: 20 (10 May 2022 08:35) (16 - 20)  SpO2: 97% (10 May 2022 08:35) (96% - 99%)    General: No acute distress, patient lying comfortably in bed.   Spine:        Anterior Dressing: clean/dry/intact        Posterior Dressing: clean/dry/ intact - Medipore dressing removed.          Sensation: intact to light touch to patient's baseline         Motor exam: 5/5 bilateral strength quadriceps/hamstrings/FHL/EHL/TA/GS. 5/5 strength bilateral biceps/triceps/fingers/thumb/.   Pulses: 2+ bilateral DP/PT             I&O's Detail    09 May 2022 07:01  -  10 May 2022 07:00  --------------------------------------------------------  IN:    Oral Fluid: 1400 mL  Total IN: 1400 mL    OUT:    Voided (mL): 2750 mL  Total OUT: 2750 mL    Total NET: -1350 mL      10 May 2022 07:01  -  10 May 2022 11:58  --------------------------------------------------------  IN:  Total IN: 0 mL    OUT:    Voided (mL): 900 mL  Total OUT: 900 mL    Total NET: -900 mL          LABS:                        13.2   11.00 )-----------( 278      ( 10 May 2022 05:57 )             40.1     05-10    133<L>  |  96  |  11  ----------------------------<  110<H>  3.6   |  25  |  0.78    Ca    8.8      10 May 2022 05:57            MEDICATIONS:    acetaminophen     Tablet .. 975 milliGRAM(s) Oral every 8 hours  diphenhydrAMINE 50 milliGRAM(s) Oral every 6 hours PRN  HYDROmorphone   Tablet 2 milliGRAM(s) Oral every 3 hours PRN  HYDROmorphone   Tablet 4 milliGRAM(s) Oral every 3 hours PRN  HYDROmorphone  Injectable 0.5 milliGRAM(s) IV Push every 4 hours PRN  methocarbamol 500 milliGRAM(s) Oral every 8 hours  ondansetron Injectable 4 milliGRAM(s) IV Push every 6 hours PRN  pregabalin 50 milliGRAM(s) Oral three times a day          ASSESSMENT AND PLAN: 36yo Male s/p ACDF C4-C6, PSF L5-S1 by Dr. Sinha on 05/06.     1. Analgesic pain control  2. DVT prophylaxis:   SCDs - Patient requested to have removed. Educated and informed of increased risk of blood clots post operatively, pt still adamant about removing scds and demands they be removed  3. Weight Bearing Status:  Weight bearing as tolerated   4. Disposition: Home Today   5. Back Rash appears cellulitic on left flank will start Bactrim x 7 days- per medicine recommendation.   6. Urinary Retention- PVR completed, passed trial to void.

## 2022-05-23 ENCOUNTER — OUTPATIENT (OUTPATIENT)
Dept: OUTPATIENT SERVICES | Facility: HOSPITAL | Age: 37
LOS: 1 days | End: 2022-05-23
Payer: OTHER MISCELLANEOUS

## 2022-05-23 PROCEDURE — 72100 X-RAY EXAM L-S SPINE 2/3 VWS: CPT | Mod: 26

## 2022-05-23 PROCEDURE — 72040 X-RAY EXAM NECK SPINE 2-3 VW: CPT

## 2022-05-23 PROCEDURE — 72100 X-RAY EXAM L-S SPINE 2/3 VWS: CPT

## 2022-05-23 PROCEDURE — 72040 X-RAY EXAM NECK SPINE 2-3 VW: CPT | Mod: 26

## 2022-05-27 DIAGNOSIS — Z98.1 ARTHRODESIS STATUS: ICD-10-CM

## 2022-05-27 DIAGNOSIS — M40.50 LORDOSIS, UNSPECIFIED, SITE UNSPECIFIED: ICD-10-CM

## 2022-05-27 DIAGNOSIS — R22.1 LOCALIZED SWELLING, MASS AND LUMP, NECK: ICD-10-CM

## 2022-05-27 DIAGNOSIS — M89.8X8 OTHER SPECIFIED DISORDERS OF BONE, OTHER SITE: ICD-10-CM

## 2022-07-06 ENCOUNTER — OUTPATIENT (OUTPATIENT)
Dept: OUTPATIENT SERVICES | Facility: HOSPITAL | Age: 37
LOS: 1 days | End: 2022-07-06
Payer: OTHER MISCELLANEOUS

## 2022-07-06 PROCEDURE — 72100 X-RAY EXAM L-S SPINE 2/3 VWS: CPT | Mod: 26

## 2022-07-06 PROCEDURE — 72040 X-RAY EXAM NECK SPINE 2-3 VW: CPT

## 2022-07-06 PROCEDURE — 72040 X-RAY EXAM NECK SPINE 2-3 VW: CPT | Mod: 26

## 2022-07-06 PROCEDURE — 72100 X-RAY EXAM L-S SPINE 2/3 VWS: CPT

## 2022-08-11 PROCEDURE — 82803 BLOOD GASES ANY COMBINATION: CPT

## 2022-08-11 PROCEDURE — 82330 ASSAY OF CALCIUM: CPT

## 2022-08-11 PROCEDURE — 84132 ASSAY OF SERUM POTASSIUM: CPT

## 2022-08-11 PROCEDURE — 97530 THERAPEUTIC ACTIVITIES: CPT

## 2022-08-11 PROCEDURE — C1889: CPT

## 2022-08-11 PROCEDURE — 85025 COMPLETE CBC W/AUTO DIFF WBC: CPT

## 2022-08-11 PROCEDURE — 84295 ASSAY OF SERUM SODIUM: CPT

## 2022-08-11 PROCEDURE — 97162 PT EVAL MOD COMPLEX 30 MIN: CPT

## 2022-08-11 PROCEDURE — 85027 COMPLETE CBC AUTOMATED: CPT

## 2022-08-11 PROCEDURE — 76000 FLUOROSCOPY <1 HR PHYS/QHP: CPT

## 2022-08-11 PROCEDURE — 97116 GAIT TRAINING THERAPY: CPT

## 2022-08-11 PROCEDURE — 97110 THERAPEUTIC EXERCISES: CPT

## 2022-08-11 PROCEDURE — 86901 BLOOD TYPING SEROLOGIC RH(D): CPT

## 2022-08-11 PROCEDURE — 92610 EVALUATE SWALLOWING FUNCTION: CPT

## 2022-08-11 PROCEDURE — 84300 ASSAY OF URINE SODIUM: CPT

## 2022-08-11 PROCEDURE — 86850 RBC ANTIBODY SCREEN: CPT

## 2022-08-11 PROCEDURE — 86900 BLOOD TYPING SEROLOGIC ABO: CPT

## 2022-08-11 PROCEDURE — 83935 ASSAY OF URINE OSMOLALITY: CPT

## 2022-08-11 PROCEDURE — 80048 BASIC METABOLIC PNL TOTAL CA: CPT

## 2022-08-11 PROCEDURE — 36415 COLL VENOUS BLD VENIPUNCTURE: CPT

## 2022-08-11 PROCEDURE — C1713: CPT

## 2023-02-15 NOTE — PRE-OP CHECKLIST - NS PREOP CHK HIBICLENS NA
N/A Ketoconazole Pregnancy And Lactation Text: This medication is Pregnancy Category C and it isn't know if it is safe during pregnancy. It is also excreted in breast milk and breast feeding isn't recommended.

## 2023-02-27 NOTE — PHYSICAL THERAPY INITIAL EVALUATION ADULT - PERTINENT HX OF CURRENT PROBLEM, REHAB EVAL
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 Denies radiation of pain or numbness/tingling of his extremities. Ambulates without assistive walking devices. Failed conservative therapies for his symptoms.
As certified below, I, or a nurse practitioner or physician assistant working with me, had a face-to-face encounter that meets the physician face-to-face encounter requirements.

## 2023-05-04 NOTE — OCCUPATIONAL THERAPY INITIAL EVALUATION ADULT - LEVEL OF INDEPENDENCE: SIT/SUPINE, REHAB EVAL

## 2023-05-05 ENCOUNTER — OUTPATIENT (OUTPATIENT)
Dept: OUTPATIENT SERVICES | Facility: HOSPITAL | Age: 38
LOS: 1 days | End: 2023-05-05
Payer: OTHER MISCELLANEOUS

## 2023-05-05 PROCEDURE — 72050 X-RAY EXAM NECK SPINE 4/5VWS: CPT | Mod: 26

## 2023-05-05 PROCEDURE — 72110 X-RAY EXAM L-2 SPINE 4/>VWS: CPT | Mod: 26

## 2023-05-05 PROCEDURE — 72050 X-RAY EXAM NECK SPINE 4/5VWS: CPT

## 2023-05-05 PROCEDURE — 72110 X-RAY EXAM L-2 SPINE 4/>VWS: CPT
